# Patient Record
Sex: FEMALE | Race: WHITE | NOT HISPANIC OR LATINO | Employment: FULL TIME | URBAN - METROPOLITAN AREA
[De-identification: names, ages, dates, MRNs, and addresses within clinical notes are randomized per-mention and may not be internally consistent; named-entity substitution may affect disease eponyms.]

---

## 2022-11-21 ENCOUNTER — TELEPHONE (OUTPATIENT)
Dept: OBGYN CLINIC | Facility: CLINIC | Age: 37
End: 2022-11-21

## 2022-11-21 NOTE — TELEPHONE ENCOUNTER
Patient was seen by Dr. Solares today to prepare for procedure tomorrow - patient's  called and mentioned that she was supposed to have a medication sent over to her pharm but he said the pharm said it hasn't been sent in yet.     Not sure what medication it is...

## 2023-11-30 ENCOUNTER — TELEPHONE (OUTPATIENT)
Dept: NEUROLOGY | Facility: CLINIC | Age: 38
End: 2023-11-30

## 2023-11-30 NOTE — TELEPHONE ENCOUNTER
11/29 12:08    Pt left a VM re: upcoming Solumedrol infusion and respiratory infection.    Transcribed VM:   Hi, this is Pepper Forbes. I'm a patient of Dr. Sosa. My YOB: 1985. I am calling with a question regarding my upcoming infusion. I'm scheduled this Saturday, December 2nd for a Solumedrol infusion. The intent is to do it the 1st day of my cycle. My cycle has started today. As you can probably hear, I've got a nasty respiratory bug. So you know, I'm not sure that I should even attempt to go into the infusion center, whether I should take steroids. If you could please give me a call back with a bit of guidance. I would appreciate it. Thanks and have a nice day, bye.

## 2023-12-04 NOTE — TELEPHONE ENCOUNTER
Per chart review, pt did receive Solumedrol on 12/2/23.     Called and Left a message on pt's answering machine for a call back

## 2024-03-11 DIAGNOSIS — G35 MULTIPLE SCLEROSIS (HCC): Primary | ICD-10-CM

## 2024-03-11 RX ORDER — SODIUM CHLORIDE 9 MG/ML
20 INJECTION, SOLUTION INTRAVENOUS ONCE
Status: CANCELLED | OUTPATIENT
Start: 2024-03-14

## 2024-03-14 ENCOUNTER — HOSPITAL ENCOUNTER (OUTPATIENT)
Dept: INFUSION CENTER | Facility: HOSPITAL | Age: 39
Discharge: HOME | End: 2024-03-14
Attending: PSYCHIATRY & NEUROLOGY

## 2024-03-14 ENCOUNTER — OFFICE VISIT (OUTPATIENT)
Dept: FAMILY MEDICINE CLINIC | Facility: CLINIC | Age: 39
End: 2024-03-14
Payer: COMMERCIAL

## 2024-03-14 VITALS
OXYGEN SATURATION: 98 % | TEMPERATURE: 98.7 F | RESPIRATION RATE: 18 BRPM | HEART RATE: 91 BPM | BODY MASS INDEX: 33.62 KG/M2 | HEIGHT: 66 IN | SYSTOLIC BLOOD PRESSURE: 122 MMHG | DIASTOLIC BLOOD PRESSURE: 78 MMHG | WEIGHT: 209.2 LBS

## 2024-03-14 DIAGNOSIS — N30.01 ACUTE CYSTITIS WITH HEMATURIA: Primary | ICD-10-CM

## 2024-03-14 DIAGNOSIS — G35 MULTIPLE SCLEROSIS (HCC): ICD-10-CM

## 2024-03-14 LAB
BACTERIA UR QL AUTO: ABNORMAL /HPF
BILIRUB UR QL STRIP: NEGATIVE
CLARITY UR: CLEAR
COLOR UR: YELLOW
GLUCOSE UR STRIP-MCNC: NEGATIVE MG/DL
HGB UR QL STRIP.AUTO: ABNORMAL
KETONES UR STRIP-MCNC: NEGATIVE MG/DL
LEUKOCYTE ESTERASE UR QL STRIP: ABNORMAL
MUCOUS THREADS UR QL AUTO: ABNORMAL
NITRITE UR QL STRIP: NEGATIVE
NON-SQ EPI CELLS URNS QL MICRO: ABNORMAL /HPF
PH UR STRIP.AUTO: 6.5 [PH]
PROT UR STRIP-MCNC: ABNORMAL MG/DL
RBC #/AREA URNS AUTO: ABNORMAL /HPF
SL AMB  POCT GLUCOSE, UA: ABNORMAL
SL AMB LEUKOCYTE ESTERASE,UA: 1
SL AMB POCT BILIRUBIN,UA: ABNORMAL
SL AMB POCT BLOOD,UA: 2
SL AMB POCT CLARITY,UA: ABNORMAL
SL AMB POCT COLOR,UA: ABNORMAL
SL AMB POCT KETONES,UA: ABNORMAL
SL AMB POCT NITRITE,UA: ABNORMAL
SL AMB POCT PH,UA: 6
SL AMB POCT SPECIFIC GRAVITY,UA: 1.01
SL AMB POCT URINE PROTEIN: ABNORMAL
SL AMB POCT UROBILINOGEN: ABNORMAL
SP GR UR STRIP.AUTO: 1.02 (ref 1–1.03)
UROBILINOGEN UR STRIP-ACNC: <2 MG/DL
WBC #/AREA URNS AUTO: ABNORMAL /HPF

## 2024-03-14 PROCEDURE — 87186 SC STD MICRODIL/AGAR DIL: CPT | Performed by: NURSE PRACTITIONER

## 2024-03-14 PROCEDURE — 81002 URINALYSIS NONAUTO W/O SCOPE: CPT | Performed by: NURSE PRACTITIONER

## 2024-03-14 PROCEDURE — 99214 OFFICE O/P EST MOD 30 MIN: CPT | Performed by: NURSE PRACTITIONER

## 2024-03-14 PROCEDURE — 87077 CULTURE AEROBIC IDENTIFY: CPT | Performed by: NURSE PRACTITIONER

## 2024-03-14 PROCEDURE — 87086 URINE CULTURE/COLONY COUNT: CPT | Performed by: NURSE PRACTITIONER

## 2024-03-14 PROCEDURE — 81001 URINALYSIS AUTO W/SCOPE: CPT | Performed by: NURSE PRACTITIONER

## 2024-03-14 RX ORDER — CIPROFLOXACIN 500 MG/1
500 TABLET, FILM COATED ORAL EVERY 12 HOURS SCHEDULED
Qty: 10 TABLET | Refills: 0 | Status: SHIPPED | OUTPATIENT
Start: 2024-03-14 | End: 2024-03-19

## 2024-03-14 NOTE — PROGRESS NOTES
OFFICE VISIT  Pepper Hopson 38 y.o. female MRN: [id number]      Depression Screening and Follow-up Plan: Patient was screened for depression during today's encounter. They screened negative with a PHQ-2 score of 1.    Tobacco Cessation Counseling: Tobacco cessation counseling was provided. The patient is sincerely urged to quit consumption of tobacco. She is not ready to quit tobacco.          Assessment / Plan:  Problem List Items Addressed This Visit          Nervous and Auditory    Multiple sclerosis (HCC)     Under the care of neurology, current infusion, cathflo  No flare up with current infection            Other Visit Diagnoses       Acute cystitis with hematuria    -  Primary    Relevant Medications    ciprofloxacin (CIPRO) 500 mg tablet    Other Relevant Orders    POCT urine dip (Completed)    Urine culture    UA (URINE) with reflex to Scope              Reason For Visit / Chief Complaint  Chief Complaint   Patient presents with    Urinary Tract Infection     Urgency, frequency, unable to empty bladder, bear down, dysuria, hematuria.        HPI:  Pepper Hopson is a 38 y.o. female who presents today for UTI lke symptoms She reports hx of uti, similar symptoms. Urgency, burning, blood in urine.    Historical Information   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)      Past Surgical History:   Procedure Laterality Date    [place] INDUCED  DILATION & EVACUATION N/A 2022    Procedure: DILATATION AND EVACUATION (D&E)  18 weeks;  Surgeon: Torrie Solares MD;  Location: [facility];  Service: Gynecology    TONSILLECTOMY      WISDOM TOOTH EXTRACTION       Social History   Social History     Substance and Sexual Activity   Alcohol Use Not Currently    Comment: socially     Social History     Substance and Sexual Activity   Drug Use Never     Social History     Tobacco Use   Smoking Status Some Days    Current packs/day: 0.50    Average  packs/day: 0.5 packs/day for 3.2 years (1.6 ttl pk-yrs)    Types: Cigarettes    Start date: 2021    Passive exposure: Current   Smokeless Tobacco Never   Tobacco Comments    Does smoke once in a while     Family History   Problem Relation Age of Onset    No Known Problems Mother     Diabetes Father     No Known Problems Maternal Grandmother     Prostate cancer Maternal Grandfather     Dementia Maternal Grandfather     No Known Problems Paternal Grandmother     Prostate cancer Paternal Grandfather         Prostate Cancer    Diabetes Paternal Grandfather        Meds/Allergies   Allergies   Allergen Reactions    Latex Dermatitis       Meds:    Current Outpatient Medications:     ciprofloxacin (CIPRO) 500 mg tablet, Take 1 tablet (500 mg total) by mouth every 12 (twelve) hours for 5 days, Disp: 10 tablet, Rfl: 0    methylPREDNISolone sodium succinate (Solu-MEDROL) 40 mg injection, Inject into a catheter in a vein once, Disp: , Rfl:       REVIEW OF SYSTEMS  Review of Systems   Constitutional:  Negative for activity change, chills, fatigue and fever.   HENT:  Negative for congestion, ear discharge, ear pain, sinus pressure, sinus pain, sore throat, tinnitus and trouble swallowing.    Eyes:  Negative for photophobia, pain, discharge, itching and visual disturbance.   Respiratory:  Negative for cough, chest tightness, shortness of breath and wheezing.    Cardiovascular:  Negative for chest pain and leg swelling.   Gastrointestinal:  Negative for abdominal distention, abdominal pain, constipation, diarrhea, nausea and vomiting.   Endocrine: Negative for polydipsia, polyphagia and polyuria.   Genitourinary:  Positive for frequency and urgency. Negative for dysuria.   Musculoskeletal:  Negative for arthralgias, myalgias, neck pain and neck stiffness.   Skin:  Negative for color change.   Neurological:  Negative for dizziness, syncope, weakness, numbness and headaches.   Hematological:  Does not bruise/bleed easily.  "  Psychiatric/Behavioral:  Negative for behavioral problems, confusion, self-injury, sleep disturbance and suicidal ideas. The patient is not nervous/anxious.            Current Vitals:   Blood Pressure: 122/78 (03/14/24 1225)  Pulse: 91 (03/14/24 1225)  Temperature: 98.7 °F (37.1 °C) (03/14/24 1225)  Temp Source: Tympanic (03/14/24 1225)  Respirations: 18 (03/14/24 1225)  Height: 5' 6\" (167.6 cm) (03/14/24 1225)  Weight - Scale: 94.9 kg (209 lb 3.2 oz) (03/14/24 1225)  SpO2: 98 % (03/14/24 1225)  [unfilled]    PHYSICAL EXAMS:  Physical Exam  Vitals and nursing note reviewed.   Constitutional:       Appearance: Normal appearance.   HENT:      Head: Normocephalic and atraumatic.   Cardiovascular:      Rate and Rhythm: Normal rate and regular rhythm.      Pulses: Normal pulses.      Heart sounds: Normal heart sounds.   Pulmonary:      Effort: Pulmonary effort is normal.      Breath sounds: Normal breath sounds.   Musculoskeletal:      Cervical back: Normal range of motion and neck supple.   Neurological:      Mental Status: She is alert.             Lab, imaging and other studies: I have personally reviewed pertinent reports.  .             "

## 2024-03-16 LAB
BACTERIA UR CULT: ABNORMAL
BACTERIA UR CULT: ABNORMAL

## 2024-03-18 ENCOUNTER — HOSPITAL ENCOUNTER (OUTPATIENT)
Dept: INFUSION CENTER | Facility: HOSPITAL | Age: 39
Discharge: HOME | End: 2024-03-18
Attending: PSYCHIATRY & NEUROLOGY
Payer: COMMERCIAL

## 2024-03-18 VITALS — SYSTOLIC BLOOD PRESSURE: 102 MMHG | DIASTOLIC BLOOD PRESSURE: 64 MMHG | RESPIRATION RATE: 18 BRPM | TEMPERATURE: 97.2 F

## 2024-03-18 DIAGNOSIS — G35 MULTIPLE SCLEROSIS (HCC): Primary | ICD-10-CM

## 2024-03-18 PROCEDURE — 96365 THER/PROPH/DIAG IV INF INIT: CPT

## 2024-03-18 PROCEDURE — 96376 TX/PRO/DX INJ SAME DRUG ADON: CPT

## 2024-03-18 RX ORDER — SODIUM CHLORIDE 9 MG/ML
20 INJECTION, SOLUTION INTRAVENOUS ONCE
OUTPATIENT
Start: 2024-04-13

## 2024-03-18 RX ORDER — SODIUM CHLORIDE 9 MG/ML
20 INJECTION, SOLUTION INTRAVENOUS ONCE
Status: COMPLETED | OUTPATIENT
Start: 2024-03-18 | End: 2024-03-18

## 2024-03-18 RX ADMIN — SODIUM CHLORIDE 20 ML/HR: 0.9 INJECTION, SOLUTION INTRAVENOUS at 12:56

## 2024-03-18 RX ADMIN — SODIUM CHLORIDE 1000 MG: 0.9 INJECTION, SOLUTION INTRAVENOUS at 12:59

## 2024-03-18 NOTE — PROGRESS NOTES
Pepper Hopson  tolerated iv methyl prenisolone  treatment well with no complications.      Pepper Hopson is aware of future appt on 4/17/24 at 1300.     AVS printed and given to Pepper Hopson:  No (Declined by Pepper Hopson)

## 2024-04-17 ENCOUNTER — HOSPITAL ENCOUNTER (OUTPATIENT)
Dept: INFUSION CENTER | Facility: HOSPITAL | Age: 39
Discharge: HOME | End: 2024-04-17
Attending: PSYCHIATRY & NEUROLOGY
Payer: COMMERCIAL

## 2024-04-17 VITALS
TEMPERATURE: 96.7 F | HEART RATE: 70 BPM | RESPIRATION RATE: 16 BRPM | DIASTOLIC BLOOD PRESSURE: 76 MMHG | SYSTOLIC BLOOD PRESSURE: 111 MMHG

## 2024-04-17 DIAGNOSIS — G35 MULTIPLE SCLEROSIS (HCC): Primary | ICD-10-CM

## 2024-04-17 PROCEDURE — 96365 THER/PROPH/DIAG IV INF INIT: CPT

## 2024-04-17 RX ORDER — SODIUM CHLORIDE 9 MG/ML
20 INJECTION, SOLUTION INTRAVENOUS ONCE
Status: COMPLETED | OUTPATIENT
Start: 2024-04-17 | End: 2024-04-17

## 2024-04-17 RX ORDER — SODIUM CHLORIDE 9 MG/ML
20 INJECTION, SOLUTION INTRAVENOUS ONCE
OUTPATIENT
Start: 2024-05-17

## 2024-04-17 RX ADMIN — SODIUM CHLORIDE 20 ML/HR: 0.9 INJECTION, SOLUTION INTRAVENOUS at 13:26

## 2024-04-17 RX ADMIN — SODIUM CHLORIDE 1000 MG: 0.9 INJECTION, SOLUTION INTRAVENOUS at 13:58

## 2024-04-17 NOTE — PROGRESS NOTES
Pepper Hopson  tolerated Solumedrol treatment well with no complications.      Pepper Hopson is aware of future appt on 5/10 at 11:30AM.     AVS printed and given to Pepper Hopson: No (Declined by Pepper Hopson).

## 2024-05-09 ENCOUNTER — TELEPHONE (OUTPATIENT)
Dept: NEUROLOGY | Facility: CLINIC | Age: 39
End: 2024-05-09

## 2024-05-09 ENCOUNTER — OFFICE VISIT (OUTPATIENT)
Dept: FAMILY MEDICINE CLINIC | Facility: CLINIC | Age: 39
End: 2024-05-09
Payer: COMMERCIAL

## 2024-05-09 VITALS
OXYGEN SATURATION: 98 % | HEIGHT: 66 IN | DIASTOLIC BLOOD PRESSURE: 80 MMHG | HEART RATE: 62 BPM | BODY MASS INDEX: 33.91 KG/M2 | SYSTOLIC BLOOD PRESSURE: 128 MMHG | RESPIRATION RATE: 18 BRPM | WEIGHT: 211 LBS | TEMPERATURE: 98.3 F

## 2024-05-09 DIAGNOSIS — Z00.00 ENCOUNTER FOR WELL ADULT EXAM WITHOUT ABNORMAL FINDINGS: Primary | ICD-10-CM

## 2024-05-09 PROCEDURE — 99395 PREV VISIT EST AGE 18-39: CPT | Performed by: NURSE PRACTITIONER

## 2024-05-09 NOTE — TELEPHONE ENCOUNTER
Called [employer] at [phone] and spoke with Iwona. She reports case is marked urgent, tasked for review. States they are expecting determination by tomorrow AM. Did advise appt is at 11:30am. She reports she also spoke with Monet who confirms she is actively working on this.

## 2024-05-09 NOTE — TELEPHONE ENCOUNTER
Received the following email:    To all;    Per call to [facility]/ [employer] rep advised AUTH is required for codes    [id number]  in OP setting.    Please submit and advise once approved.  Patient has a GH infusion appt on Friday  5/10    Thanks in advance    ….. Moon Salmeron  Pre-Encounter Representative  [address] [facility]  [place] [address]  [phone] Office  [phone] Fax  [email or web address]    Unable to locate pt on [facility] provider portal.     Called [facility]/[employer] at [phone] and spoke with Monet. She confirmed codes , 05410, and 58467 do now require PA. Initiated verbal PA. Clinicals to be faxed to [phone]. Pending auth # [id number]. She reports she is unable to rolando requests as urgent. States case is under review with [facility] care management. Must call them directly at [phone].     Called [facility] care management at [phone] and spoke with Radha. She reviewed above case and advised PA is not required for , 02346, or 72213. Call disconnected before obtaining reference #. Called back and spoke with Antione.  He confirms PA is not required for , 70318, or 65694. He states predetermination is not recommended for these codes. Reference: Antione AZAR 5/9/24    Received voicemail from Monet with [employer] stating they changed case to predetermination. Asking for clinicals to be faxed to [phone]. They will try to review today.   phone # [phone] ext [phone].    Clinicals faxed successfully. Request uploaded to chart. Will f/u with Monet.

## 2024-05-09 NOTE — PROGRESS NOTES
ADULT ANNUAL PHYSICAL  [facility]    NAME: Pepper Hopson  AGE: 38 y.o. SEX: female  : 1985     DATE: 2024     Assessment and Plan:     Problem List Items Addressed This Visit    None  Visit Diagnoses       Encounter for well adult exam without abnormal findings    -  Primary            Immunizations and preventive care screenings were discussed with patient today. Appropriate education was printed on patient's after visit summary.    Counseling:  Alcohol/drug use: discussed moderation in alcohol intake, the recommendations for healthy alcohol use, and avoidance of illicit drug use.  Dental Health: discussed importance of regular tooth brushing, flossing, and dental visits.  Injury prevention: discussed safety/seat belts, safety helmets, smoke detectors, carbon dioxide detectors, and smoking near bedding or upholstery.  Sexual health: discussed sexually transmitted diseases, partner selection, use of condoms, avoidance of unintended pregnancy, and contraceptive alternatives.  Exercise: the importance of regular exercise/physical activity was discussed. Recommend exercise 3-5 times per week for at least 30 minutes.       Tobacco Cessation Counseling: The patient is sincerely urged to quit consumption of tobacco. She is not ready to quit tobacco. Medication options discussed.         Return in about 1 year (around 2025) for Annual physical.     Chief Complaint:     Chief Complaint   Patient presents with    Physical Exam      History of Present Illness:     Adult Annual Physical   Patient here for a comprehensive physical exam. The patient reports no problems.    Diet and Physical Activity  Diet/Nutrition: well balanced diet, poor diet, and adding vegetables. Tried weight watchers .   Exercise: walking and recent puppy  .      Depression Screening  PHQ-2/9 Depression Screening    Little interest or pleasure in doing things: 1 - several  days  Feeling down, depressed, or hopeless: 0 - not at all       General Health  Sleep: gets 4-6 hours of sleep on average.   Hearing: normal - bilateral.  Vision: no vision problems.   Dental: regular dental visits.       /GYN Health  Follows with gynecology? yes   Last menstrual period: regular  Contraceptive method:  none .  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 no  Do you have a durable medical power of ? no  ACP document given to the patient? no      Review of Systems:     Review of Systems   Constitutional:  Negative for activity change, chills, fatigue and fever.   HENT:  Negative for congestion, ear discharge, ear pain, sinus pressure, sinus pain, sore throat, tinnitus and trouble swallowing.    Eyes:  Negative for photophobia, pain, discharge, itching and visual disturbance.   Respiratory:  Negative for cough, chest tightness, shortness of breath and wheezing.    Cardiovascular:  Negative for chest pain and leg swelling.   Gastrointestinal:  Negative for abdominal distention, abdominal pain, constipation, diarrhea, nausea and vomiting.   Endocrine: Negative for polydipsia, polyphagia and polyuria.   Genitourinary:  Negative for dysuria and frequency.   Musculoskeletal:  Negative for arthralgias, myalgias, neck pain and neck stiffness.   Skin:  Negative for color change.   Neurological:  Negative for dizziness, syncope, weakness, numbness and headaches.   Hematological:  Does not bruise/bleed easily.   Psychiatric/Behavioral:  Negative for behavioral problems, confusion, self-injury, sleep disturbance and suicidal ideas. The patient is not nervous/anxious.       Past Medical History:     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)       Past Surgical History:     Past Surgical History:   Procedure Laterality Date    [place] INDUCED  DILATION & EVACUATION N/A 2022    Procedure:  DILATATION AND EVACUATION (D&E)  18 weeks;  Surgeon: Torrie Solares MD;  [facility];  Service: Gynecology    TONSILLECTOMY      WISDOM TOOTH EXTRACTION        Social History:     Social History     Socioeconomic History    Marital status: Single     Spouse name: None    Number of children: None    Years of education: None    Highest education level: None   Occupational History    None   Tobacco Use    Smoking status: Every Day     Current packs/day: 0.50     Average packs/day: 0.5 packs/day for 3.4 years (1.7 ttl pk-yrs)     Types: Cigarettes     Start date: 2021     Passive exposure: Current    Smokeless tobacco: Never    Tobacco comments:     Does smoke once in a while   Vaping Use    Vaping status: Never Used   Substance and Sexual Activity    Alcohol use: Not Currently     Comment: socially    Drug use: Never    Sexual activity: Yes     Partners: Male     Birth control/protection: None   Other Topics Concern    None   Social History Narrative    None     Social Determinants of Health     Financial Resource Strain: Not on file   Food Insecurity: Not on file   Transportation Needs: Not on file   Physical Activity: Not on file   Stress: Not on file   Social Connections: Not on file   Intimate Partner Violence: Not on file   Housing Stability: Not on file      Family History:     Family History   Problem Relation Age of Onset    No Known Problems Mother     Diabetes Father     No Known Problems Maternal Grandmother     Prostate cancer Maternal Grandfather     Dementia Maternal Grandfather     No Known Problems Paternal Grandmother     Prostate cancer Paternal Grandfather         Prostate Cancer    Diabetes Paternal Grandfather       Current Medications:     Current Outpatient Medications   Medication Sig Dispense Refill    methylPREDNISolone sodium succinate (Solu-MEDROL) 40 mg injection Inject into a catheter in a vein once       No current facility-administered medications for this visit.       "Allergies:     Allergies   Allergen Reactions    Latex Dermatitis      Physical Exam:     /80 (BP Location: Left arm, Patient Position: Sitting, Cuff Size: Standard)   Pulse 62   Temp 98.3 °F (36.8 °C) (Tympanic)   Resp 18   Ht 5' 6\" (1.676 m)   Wt 95.7 kg (211 lb)   SpO2 98%   BMI 34.06 kg/m²     Physical Exam  Vitals and nursing note reviewed.   Constitutional:       Appearance: Normal appearance.   HENT:      Head: Normocephalic and atraumatic.   Cardiovascular:      Rate and Rhythm: Normal rate and regular rhythm.      Pulses: Normal pulses.      Heart sounds: Normal heart sounds.   Musculoskeletal:         General: Normal range of motion.      Cervical back: Normal range of motion and neck supple.   Skin:     General: Skin is warm.   Neurological:      Mental Status: She is alert and oriented to person, place, and time.   Psychiatric:         Behavior: Behavior normal.          JESSICA Akbar   [facility]    "

## 2024-05-10 NOTE — TELEPHONE ENCOUNTER
Called [employer] at [phone] and spoke with Iwona. Solumedrol infusions are approved:    Solumedrol (, 84395, and 21599) is approved with [facility]/[employer] from 5/9/24 to 6/20/24 for 2 visits. Auth # [id number].    Referral updated.

## 2024-05-13 ENCOUNTER — HOSPITAL ENCOUNTER (OUTPATIENT)
Dept: INFUSION CENTER | Facility: HOSPITAL | Age: 39
Discharge: HOME | End: 2024-05-13
Attending: PSYCHIATRY & NEUROLOGY
Payer: COMMERCIAL

## 2024-05-13 DIAGNOSIS — G35 MULTIPLE SCLEROSIS (HCC): Primary | ICD-10-CM

## 2024-05-13 RX ORDER — SODIUM CHLORIDE 9 MG/ML
20 INJECTION, SOLUTION INTRAVENOUS ONCE
OUTPATIENT
Start: 2024-05-17

## 2024-05-13 RX ORDER — SODIUM CHLORIDE 9 MG/ML
20 INJECTION, SOLUTION INTRAVENOUS ONCE
Status: COMPLETED | OUTPATIENT
Start: 2024-05-13 | End: 2024-05-13

## 2024-05-13 RX ADMIN — SODIUM CHLORIDE 20 ML/HR: 0.9 INJECTION, SOLUTION INTRAVENOUS at 12:00

## 2024-05-13 RX ADMIN — SODIUM CHLORIDE 1000 MG: 0.9 INJECTION, SOLUTION INTRAVENOUS at 12:35

## 2024-05-13 NOTE — PROGRESS NOTES
Pepper Hopson  tolerated iv steroids well with no complications.      Pepper Hopson is aware of future appt on June 12th     AVS printed and given to Pepper Hopson:  No (Declined by Pepper Hopson)

## 2024-06-12 ENCOUNTER — OFFICE VISIT (OUTPATIENT)
Dept: NEUROLOGY | Facility: CLINIC | Age: 39
End: 2024-06-12
Payer: COMMERCIAL

## 2024-06-12 ENCOUNTER — TELEPHONE (OUTPATIENT)
Dept: NEUROLOGY | Facility: CLINIC | Age: 39
End: 2024-06-12

## 2024-06-12 VITALS
TEMPERATURE: 98.2 F | BODY MASS INDEX: 34.15 KG/M2 | OXYGEN SATURATION: 97 % | WEIGHT: 211.6 LBS | DIASTOLIC BLOOD PRESSURE: 74 MMHG | SYSTOLIC BLOOD PRESSURE: 112 MMHG | HEART RATE: 95 BPM

## 2024-06-12 DIAGNOSIS — G35 MULTIPLE SCLEROSIS (HCC): Primary | ICD-10-CM

## 2024-06-12 PROCEDURE — 99215 OFFICE O/P EST HI 40 MIN: CPT | Performed by: PSYCHIATRY & NEUROLOGY

## 2024-06-12 NOTE — TELEPHONE ENCOUNTER
Dr. Stephens,    I am following Pepper Hopson in the office for MS.     I would like if you could see the patient for MS.    The intent of your evaluation would be for a transfer of care. If agreeable, the patient should be scheduled with specialty attending and previously scheduled follow up appointments scheduled with me (transferring doctor) will be cancelled.     I saw the patient today in [place] office and she would like to have her FU visit in [place] office. She was scheduled with Joo but under your supervision if you accept it.     Thanks,    Krystyna Sosa MD

## 2024-06-12 NOTE — TELEPHONE ENCOUNTER
If transferring care then appointment should be scheduled with Dr. Stephens.   Otherwise if you are continuing to see patient and she is only in need of follow up in [place]- she can be scheduled with me.

## 2024-06-12 NOTE — PROGRESS NOTES
Patient ID: Pepper Hopson is a 38 y.o. female.    Assessment/Plan:           Problem List Items Addressed This Visit          Nervous and Auditory    Multiple sclerosis (HCC) - Primary    Relevant Orders    Hepatitis panel, acute    Quantiferon TB Gold Plus Assay    HIV 1/2 AG/AB w Reflex [facility] for 2 yr old and above    IgG, IgA, IgM    CBC and differential    Comprehensive metabolic panel        Mrs. Hopson has presented to [facility] [place] for follow-up of multiple sclerosis and related concerns.  Patient describes no new sensorimotor dysfunction as patient is fully ambulatory with no focal weakness or balance issues, no visual dysfunction or vertigo.  Patient had last completed disease modifying therapy in January 2023, at that time patient received ocrelizumab 600 mg IV.  Patient was transitioned to Solu-Medrol 1 g IV infusion on the first day of the menstrual cycle in preparation for pregnancy.  Patient received last 3 doses of Solu-Medrol on March 18, April 17, and May 13, 2024.    Patient has been traveling related to her job and she finds it difficult to be able to accommodate Solu-Medrol infusion in her work-related schedule.    We also discussed potential comorbidities which can be triggered by chronic steroid use including drug-induced diabetes, osteoporosis, avascular necrosis, mood swings and other hormonal dysfunction.    Considering patient had brain MRI completed in January 2024 with no disease progression or new imaging place advised, patient was offered starting ofatumumab in preparation to her pregnancy.  We reviewed risk and benefits as well as mechanism of action of medication.  Patient was offered basic serologic workup considering starting again high efficacy immunosuppressive regimen.  Form was signed and faxed.    Patient finds herself more fatigued and more depressed due to recent family loss.    Patient is to continue healthy dietary approach and regular physical  activity.  Patient is to continue prenatal vitamins.    Patient will be following at [place] neurology office, as per the patient request, as it saves her travel time.     I have spent a total time of 35 minutes on 06/12/24 in caring for this patient including Prognosis, Risks and benefits of tx options, Instructions for management, Counseling / Coordination of care, Documenting in the medical record, Reviewing / ordering tests, medicine, procedures  , Obtaining or reviewing history  , and Communicating with other healthcare professionals .     Subjective:MS and pregnancy related questions     HPI    Mrs. Forbes has presented to [facility] for follow-up on multiple sclerosis and related issues.    Today's concerns: no concerns re MS symptoms, however would like to discuss the solu medrol, would like to try and get pregnant but she travels for work and very difficult with the solumedrol.  Patient required multiple rescheduling of Solu-Medrol as her first day of menstrual period might be taking place while she is traveling with no access to Solu-Medrol infusion.     Patient describes no new sensorimotor dysfunction, no significant fatigue, no vision loss no double vision.  Patient ambulates without assistive devices.      We previously discussed natural history of multiple sclerosis and despite patient has moderate burden of demyelination, patient has excellent remyelinating capacity with no T1 black holes appreciated in her brain parenchyma.     Considering patient had ocrelizumab infusion in January 2023, patient was advised avoiding her infusion in July 2023 in preparation to conceive her child.  Patient will be offered having serological work-up with [facility] lymphocyte this weekend and upon menstrual cycle coming within the next 7 to 10 days, we will be offering Solu-Medrol 1 g IV on the first day of her menstrual cycle as patient has not changed her plans to conceive the child anytime this  year.      Patient is in a very good mood, she is motivated.  Patient is to continue healthy diet approach and regular physical activity.  Patient is to consider continue prenatal vitamins.    Patient describes no new sensorimotor dysfunction, no vision loss, no double vision.  Patient had last ocrelizumab infusion in 2023 as patient had started monthly Solu-Medrol 1 g IV infusion in 2023 in preparation for conceiving the child.         Patient was offered to establish care with urology for urodynamic study as long as she is not pregnant at that time.       The following portions of the patient's history were reviewed and updated as appropriate: She  has a past medical history of Multiple sclerosis affecting pregnancy in first trimester (HCC).  She   Patient Active Problem List    Diagnosis Date Noted    Numbness and tingling of left leg 2023    Abnormal chromosomal and genetic finding on  screening mother 2022    Multiple sclerosis (HCC) 2012     She  has a past surgical history that includes Tonsillectomy; [place] tooth extraction; and pr induced  dilation & evacuation (N/A, 2022).  Her family history includes Dementia in her maternal grandfather; Diabetes in her father and paternal grandfather; No Known Problems in her maternal grandmother, mother, and paternal grandmother; Prostate cancer in her maternal grandfather and paternal grandfather.  She  reports that she has been smoking cigarettes. She started smoking about 3 years ago. She has a 1.7 pack-year smoking history. She has been exposed to tobacco smoke. She has never used smokeless tobacco. She reports that she does not currently use alcohol. She reports that she does not use drugs.  Current Outpatient Medications   Medication Sig Dispense Refill    methylPREDNISolone sodium succinate (Solu-MEDROL) 40 mg injection Inject into a catheter in a vein once       No current facility-administered medications  for this visit.     Current Outpatient Medications on File Prior to Visit   Medication Sig    methylPREDNISolone sodium succinate (Solu-MEDROL) 40 mg injection Inject into a catheter in a vein once     No current facility-administered medications on file prior to visit.     She is allergic to latex..         Objective:    Blood pressure 112/74, pulse 95, temperature 98.2 °F (36.8 °C), temperature source Temporal, weight 96 kg (211 lb 9.6 oz), SpO2 97%, not currently breastfeeding.    Physical Exam    Neurological Exam  CONSTITUTIONAL: NAD, pleasant. NECK: supple, no lymphadenopathy, no thyromegaly, no JVD. CARDIOVASCULAR: RRR, normal S1S2, no murmurs, no rubs. RESP: clear to auscultation bilaterally, no wheezes/rhonchi/rales. ABDOMEN: soft, non tender, non distended. SKIN: no rash or skin lesions. EXTREMITIES: no edema, pulses 2+bilaterally. PSYCH: appropriate mood and affect  NEUROLOGIC COMPREHENSIVE EXAM: Patient is oriented to person, place and time, NAD; appropriate affect. CN II, III, IV, V, VI, VII,VIII,IX,X,XI-XII intact with EOMI, PERRLA, OKN intact, VF grossly intact, fundi poorly visualized secondary to pupillary constriction; symmetric face noted. Motor: 5/5 UE/LE bilateral symmetric; Sensory: intact to light touch and pinprick bilaterally; normal vibration sensation feet bilaterally; Coordination within normal limits on FTN and KRISTIAN testing; DTR: 2/4 through, no Babinski, no clonus. Tandem gait is intact. Romberg: absent.    ROS:    Review of Systems   Constitutional:  Negative for appetite change, fatigue and fever.   HENT: Negative.  Negative for hearing loss, tinnitus, trouble swallowing and voice change.    Eyes: Negative.  Negative for photophobia, pain and visual disturbance.   Respiratory: Negative.  Negative for shortness of breath.    Cardiovascular: Negative.  Negative for palpitations.   Gastrointestinal: Negative.  Negative for nausea and vomiting.   Endocrine: Negative.  Negative for cold  intolerance.   Genitourinary: Negative.  Negative for dysuria, frequency and urgency.   Musculoskeletal:  Negative for back pain, gait problem, myalgias, neck pain and neck stiffness.   Skin: Negative.  Negative for rash.   Allergic/Immunologic: Negative.    Neurological: Negative.  Negative for dizziness, tremors, seizures, syncope, facial asymmetry, speech difficulty, weakness, light-headedness, numbness and headaches.        No concerns symptom wise but would like to discuss meds   Hematological: Negative.  Does not bruise/bleed easily.   Psychiatric/Behavioral: Negative.  Negative for confusion, hallucinations and sleep disturbance.

## 2024-06-12 NOTE — TELEPHONE ENCOUNTER
Patient declined following with me at [facility].   Patient can be re-scheduled with Dr. Stephens if case accepted

## 2024-06-13 ENCOUNTER — TELEPHONE (OUTPATIENT)
Dept: NEUROLOGY | Facility: CLINIC | Age: 39
End: 2024-06-13

## 2024-06-13 NOTE — TELEPHONE ENCOUNTER
Please cancel FU visit with Joo LUCERO was completed and Dr. Stephens accepted the case and he agreed to follow closely with the patient.    FU 3 mos - 60 min OVL advised

## 2024-06-14 NOTE — TELEPHONE ENCOUNTER
Called patient in attempt to advise of the below message and schedule 60 ovl w/ Dr Stephens appt as requested. No answer. LMOM and call back phone number.

## 2024-06-18 ENCOUNTER — APPOINTMENT (OUTPATIENT)
Dept: LAB | Facility: CLINIC | Age: 39
End: 2024-06-18
Payer: COMMERCIAL

## 2024-06-18 DIAGNOSIS — N31.9 NEUROGENIC BLADDER: ICD-10-CM

## 2024-06-18 DIAGNOSIS — G35 MULTIPLE SCLEROSIS (HCC): ICD-10-CM

## 2024-06-18 LAB
ALBUMIN SERPL BCP-MCNC: 4.1 G/DL (ref 3.5–5)
ALP SERPL-CCNC: 65 U/L (ref 34–104)
ALT SERPL W P-5'-P-CCNC: 17 U/L (ref 7–52)
ANION GAP SERPL CALCULATED.3IONS-SCNC: 8 MMOL/L (ref 4–13)
AST SERPL W P-5'-P-CCNC: 16 U/L (ref 13–39)
BASOPHILS # BLD AUTO: 0.06 THOUSANDS/ÂΜL (ref 0–0.1)
BASOPHILS NFR BLD AUTO: 1 % (ref 0–1)
BILIRUB SERPL-MCNC: 0.36 MG/DL (ref 0.2–1)
BILIRUB UR QL STRIP: NEGATIVE
BUN SERPL-MCNC: 9 MG/DL (ref 5–25)
CALCIUM SERPL-MCNC: 9 MG/DL (ref 8.4–10.2)
CHLORIDE SERPL-SCNC: 104 MMOL/L (ref 96–108)
CLARITY UR: CLEAR
CO2 SERPL-SCNC: 25 MMOL/L (ref 21–32)
COLOR UR: COLORLESS
CREAT SERPL-MCNC: 0.43 MG/DL (ref 0.6–1.3)
EOSINOPHIL # BLD AUTO: 0.4 THOUSAND/ÂΜL (ref 0–0.61)
EOSINOPHIL NFR BLD AUTO: 6 % (ref 0–6)
ERYTHROCYTE [DISTWIDTH] IN BLOOD BY AUTOMATED COUNT: 13.6 % (ref 11.6–15.1)
GFR SERPL CREATININE-BSD FRML MDRD: 131 ML/MIN/1.73SQ M
GLUCOSE P FAST SERPL-MCNC: 90 MG/DL (ref 65–99)
GLUCOSE UR STRIP-MCNC: NEGATIVE MG/DL
HAV IGM SER QL: NORMAL
HBV CORE IGM SER QL: NORMAL
HBV SURFACE AG SER QL: NORMAL
HCT VFR BLD AUTO: 45 % (ref 34.8–46.1)
HCV AB SER QL: NORMAL
HGB BLD-MCNC: 14.6 G/DL (ref 11.5–15.4)
HGB UR QL STRIP.AUTO: NEGATIVE
HIV 1+2 AB+HIV1 P24 AG SERPL QL IA: NORMAL
HIV 2 AB SERPL QL IA: NORMAL
HIV1 AB SERPL QL IA: NORMAL
HIV1 P24 AG SERPL QL IA: NORMAL
IGA SERPL-MCNC: 218 MG/DL (ref 66–433)
IGG SERPL-MCNC: 863 MG/DL (ref 635–1741)
IGM SERPL-MCNC: 38 MG/DL (ref 45–281)
IMM GRANULOCYTES # BLD AUTO: 0.02 THOUSAND/UL (ref 0–0.2)
IMM GRANULOCYTES NFR BLD AUTO: 0 % (ref 0–2)
KETONES UR STRIP-MCNC: NEGATIVE MG/DL
LEUKOCYTE ESTERASE UR QL STRIP: NEGATIVE
LYMPHOCYTES # BLD AUTO: 2.02 THOUSANDS/ÂΜL (ref 0.6–4.47)
LYMPHOCYTES NFR BLD AUTO: 30 % (ref 14–44)
MCH RBC QN AUTO: 29.3 PG (ref 26.8–34.3)
MCHC RBC AUTO-ENTMCNC: 32.4 G/DL (ref 31.4–37.4)
MCV RBC AUTO: 90 FL (ref 82–98)
MONOCYTES # BLD AUTO: 0.5 THOUSAND/ÂΜL (ref 0.17–1.22)
MONOCYTES NFR BLD AUTO: 7 % (ref 4–12)
NEUTROPHILS # BLD AUTO: 3.85 THOUSANDS/ÂΜL (ref 1.85–7.62)
NEUTS SEG NFR BLD AUTO: 56 % (ref 43–75)
NITRITE UR QL STRIP: NEGATIVE
NRBC BLD AUTO-RTO: 0 /100 WBCS
PH UR STRIP.AUTO: 6.5 [PH]
PLATELET # BLD AUTO: 317 THOUSANDS/UL (ref 149–390)
PMV BLD AUTO: 10.4 FL (ref 8.9–12.7)
POTASSIUM SERPL-SCNC: 4.2 MMOL/L (ref 3.5–5.3)
PROT SERPL-MCNC: 6.8 G/DL (ref 6.4–8.4)
PROT UR STRIP-MCNC: NEGATIVE MG/DL
RBC # BLD AUTO: 4.99 MILLION/UL (ref 3.81–5.12)
SODIUM SERPL-SCNC: 137 MMOL/L (ref 135–147)
SP GR UR STRIP.AUTO: 1.01 (ref 1–1.03)
T4 FREE SERPL-MCNC: 0.77 NG/DL (ref 0.61–1.12)
TSH SERPL DL<=0.05 MIU/L-ACNC: 1.07 UIU/ML (ref 0.45–4.5)
UROBILINOGEN UR STRIP-ACNC: <2 MG/DL
WBC # BLD AUTO: 6.85 THOUSAND/UL (ref 4.31–10.16)

## 2024-06-18 PROCEDURE — 80053 COMPREHEN METABOLIC PANEL: CPT | Performed by: PSYCHIATRY & NEUROLOGY

## 2024-06-18 PROCEDURE — 87389 HIV-1 AG W/HIV-1&-2 AB AG IA: CPT

## 2024-06-18 PROCEDURE — 80074 ACUTE HEPATITIS PANEL: CPT

## 2024-06-18 PROCEDURE — 84439 ASSAY OF FREE THYROXINE: CPT

## 2024-06-18 PROCEDURE — 81003 URINALYSIS AUTO W/O SCOPE: CPT

## 2024-06-18 PROCEDURE — 36415 COLL VENOUS BLD VENIPUNCTURE: CPT

## 2024-06-18 PROCEDURE — 82784 ASSAY IGA/IGD/IGG/IGM EACH: CPT

## 2024-06-18 PROCEDURE — 85025 COMPLETE CBC W/AUTO DIFF WBC: CPT

## 2024-06-18 PROCEDURE — 84443 ASSAY THYROID STIM HORMONE: CPT

## 2024-06-18 PROCEDURE — 86480 TB TEST CELL IMMUN MEASURE: CPT

## 2024-06-19 LAB
GAMMA INTERFERON BACKGROUND BLD IA-ACNC: 0.01 IU/ML
M TB IFN-G BLD-IMP: NEGATIVE
M TB IFN-G CD4+ BCKGRND COR BLD-ACNC: 0 IU/ML
M TB IFN-G CD4+ BCKGRND COR BLD-ACNC: 0 IU/ML
MITOGEN IGNF BCKGRD COR BLD-ACNC: 9.99 IU/ML

## 2024-06-24 ENCOUNTER — TELEPHONE (OUTPATIENT)
Dept: NEUROLOGY | Facility: CLINIC | Age: 39
End: 2024-06-24

## 2024-06-24 DIAGNOSIS — G35 MULTIPLE SCLEROSIS (HCC): Primary | ICD-10-CM

## 2024-06-24 NOTE — TELEPHONE ENCOUNTER
"Valeria  6/24/24 2:34 PM:    \"Mateo calling from [employer] calling regarding medication prior authorization. Starting Gopal. for Pepper Hopson [birth year]. PA can be done on [facility] with Key     CB# [phone]  --------------------------------------------------  ANABEL Herron     [facility] DANIEL: [id number]  Fax [phone]      "

## 2024-06-25 NOTE — TELEPHONE ENCOUNTER
New start [facility]. Form submitted 6/12/24.     Labs completed 6/18/24.     Below PA started on incorrect form. Initiated new PA on CM, Key: [id number]. Received immediate approval:    CaseId:[id number];Status:Approved;Review Type:Prior Auth;Coverage Start Date:05/26/2024;Coverage End Date:06/25/2025;;CaseId:[id number];Status:Approved;Review Type:Qty;Coverage Start Date:05/26/2024;Coverage End Date:07/25/2024;. Authorization Expiration Date: June 25, 2025.     Awaiting Rx transfer.

## 2024-07-05 RX ORDER — OFATUMUMAB 20 MG/.4ML
20 INJECTION, SOLUTION SUBCUTANEOUS
Qty: 0.4 ML | Refills: 11 | Status: SHIPPED | OUTPATIENT
Start: 2024-07-05

## 2024-07-05 RX ORDER — OFATUMUMAB 20 MG/.4ML
INJECTION, SOLUTION SUBCUTANEOUS
Qty: 1.2 ML | Refills: 0 | Status: SHIPPED | OUTPATIENT
Start: 2024-07-05

## 2024-07-05 NOTE — TELEPHONE ENCOUNTER
Pt not found in [facility] portal. Prescriptions likely not received from [facility] team.     Scripts pended, please review and sign if agreeable.

## 2024-07-19 ENCOUNTER — TELEPHONE (OUTPATIENT)
Dept: NEUROLOGY | Facility: CLINIC | Age: 39
End: 2024-07-19

## 2024-07-22 ENCOUNTER — TELEPHONE (OUTPATIENT)
Dept: NEUROLOGY | Facility: CLINIC | Age: 39
End: 2024-07-22

## 2024-08-05 ENCOUNTER — TELEPHONE (OUTPATIENT)
Age: 39
End: 2024-08-05

## 2024-08-05 NOTE — TELEPHONE ENCOUNTER
Pt has possible UTI can medication be sent in provider had previously discuss  a virtual for next visit. Pt is  open to virtual tomorrow but ask if meds can be sent in today.

## 2024-08-05 NOTE — TELEPHONE ENCOUNTER
Reached out to patient and offered her a time for today and tomorrow for unable to do those times. Patient made aware PCP is out of the office all week

## 2024-08-12 DIAGNOSIS — G35 MULTIPLE SCLEROSIS (HCC): Primary | ICD-10-CM

## 2024-08-26 ENCOUNTER — OFFICE VISIT (OUTPATIENT)
Dept: OBGYN CLINIC | Facility: CLINIC | Age: 39
End: 2024-08-26
Payer: COMMERCIAL

## 2024-08-26 VITALS
WEIGHT: 218 LBS | SYSTOLIC BLOOD PRESSURE: 120 MMHG | HEIGHT: 65 IN | BODY MASS INDEX: 36.32 KG/M2 | DIASTOLIC BLOOD PRESSURE: 94 MMHG

## 2024-08-26 DIAGNOSIS — Z12.31 ENCOUNTER FOR SCREENING MAMMOGRAM FOR MALIGNANT NEOPLASM OF BREAST: ICD-10-CM

## 2024-08-26 DIAGNOSIS — Z11.51 SCREENING FOR HUMAN PAPILLOMAVIRUS (HPV): ICD-10-CM

## 2024-08-26 DIAGNOSIS — Z01.419 ROUTINE GYNECOLOGICAL EXAMINATION: Primary | ICD-10-CM

## 2024-08-26 PROCEDURE — G0476 HPV COMBO ASSAY CA SCREEN: HCPCS | Performed by: PHYSICIAN ASSISTANT

## 2024-08-26 PROCEDURE — 99395 PREV VISIT EST AGE 18-39: CPT | Performed by: PHYSICIAN ASSISTANT

## 2024-08-26 PROCEDURE — G0145 SCR C/V CYTO,THINLAYER,RESCR: HCPCS | Performed by: PHYSICIAN ASSISTANT

## 2024-08-26 NOTE — PROGRESS NOTES
Assessment   38 y.o.  presenting for annual exam.     Plan   Diagnoses and all orders for this visit:    Routine gynecological examination  -     Liquid-based pap, screening    Normal findings on routine exam.  Encouraged 150 min of exercise per week.  Reviewed balanced diet.   Multivitamin encouraged   Breast awareness/SBE encouraged     Screening for human papillomavirus (HPV)  -     Liquid-based pap, screening    Encounter for screening mammogram for malignant neoplasm of breast  -     Mammo screening bilateral w 3d & cad; Future        Pap - done today   Mammo slip given (on request)   Colonoscopy due @ 45       RTO one year for yearly exam or sooner as needed.    __________________________________________________________________    Subjective     Pepper Hopson is a 38 y.o.  presenting for annual exam.     Works for [employer], primarily from home. Work hasn't been as busy lately and is traveling less. As a result is going a little stir crazy. Signed up for [employer] program. Not exercising regularly but tries to get outside with her puppy daily.     She and her partner are doing well. Underwent TAB 2022 due to Trisomy 21. They remain open to pregnancy and are not using contraception. Not interested in reproductive endo consult. Believes her partner has abn sperm since he was unable to conceive with his prior partner x 10 years.     Has been changed to Kesimpta by her neurologist for her MS. Good control with this.     Recent health scare due to two friends dx with breast CA and father dx with unexpected cancer in the last year. Requesting early mammogram.     SCREENING  Last Pap: 10/13/2022 ASCUS/HPV 16 +   Last Mammo: n/a  Last Colonoscopy: n/a     GYN    Periods are regular, q 26-28 days, lasting 7 days.  Dysmenorrhea:mild.   Cyclic symptoms - mild     Sexually active:  with her    Concerns: denies pain, bleeding, dryness   Contraception: none - open to pregnancy.      Hx Abnormal pap:    10/13/2022 ASCUS/HPV 16 +   2023 colpo benign.   We reviewed ASCCP guidelines for Pap testing today.  Gardasil: She has not completed the Gardasil series.    Denies vaginal discharge, itching, odor, dyspareunia, pelvic pain and vulvar/vaginal symptoms    OB         Complaints: denies   Denies urgency, frequency, hematuria, leakage / change in stream, difficulty urinating.       BREAST  Complaints: denies   Denies: breast lump, breast tenderness, nipple discharge, skin color change, and skin lesion(s)    Pertinent Family Hx:   Family hx of breast cancer: no  Family hx of ovarian cancer: no  Family hx of colon cancer: no      GENERAL  PMH reviewed/updated and is as below.     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)        Past Surgical History:   Procedure Laterality Date    OR INDUCED  DILATION & EVACUATION N/A 2022    Procedure: DILATATION AND EVACUATION (D&E)  18 weeks;  Surgeon: Torrie Solares MD;  Location: [facility];  Service: Gynecology    TONSILLECTOMY      WISDOM TOOTH EXTRACTION           Current Outpatient Medications:     Kesimpta 20 MG/0.4ML SOAJ, Inject 20mg under the skin at weeks 0, 1, and 2., Disp: 1.2 mL, Rfl: 0    Kesimpta 20 MG/0.4ML SOAJ, Inject 20 mg under the skin every 28 days Starting at week 4, Disp: 0.4 mL, Rfl: 11    Allergies   Allergen Reactions    Latex Dermatitis    Pollen Extract Allergic Rhinitis       Social History     Socioeconomic History    Marital status: Single     Spouse name: Not on file    Number of children: Not on file    Years of education: Not on file    Highest education level: Not on file   Occupational History    Not on file   Tobacco Use    Smoking status: Every Day     Current packs/day: 0.50     Average packs/day: 0.5 packs/day for 3.7 years (1.8 ttl pk-yrs)     Types: Cigarettes     Start date:      Passive exposure: Current    Smokeless tobacco: Never    Tobacco  "comments:     Does smoke once in a while   Vaping Use    Vaping status: Never Used   Substance and Sexual Activity    Alcohol use: Not Currently     Comment: socially    Drug use: Never    Sexual activity: Yes     Partners: Male     Birth control/protection: None   Other Topics Concern    Not on file   Social History Narrative    Not on file     Social Determinants of Health     Financial Resource Strain: Not on file   Food Insecurity: Not on file   Transportation Needs: Not on file   Physical Activity: Not on file   Stress: Not on file   Social Connections: Not on file   Intimate Partner Violence: Not on file   Housing Stability: Not on file       Review of Systems     Constitutional: Negative.   Respiratory: Negative.    Cardiovascular: Negative   Gastrointestinal: Negative   Breasts: As noted above.   Genitourinary: As noted above.   Psychiatric: Negative     Objective      /94 (BP Location: Left arm, Patient Position: Sitting, Cuff Size: Large)   Ht 5' 5\" (1.651 m)   Wt 98.9 kg (218 lb)   LMP 08/23/2024 (Exact Date)   BMI 36.28 kg/m²     Physical Examination:    Patient appears well and is not in distress  Breasts are symmetrical without mass, tenderness, nipple discharge, skin changes or adenopathy.   Abdomen is soft and nontender without masses.   External genitals are normal without lesions or rashes.  Urethral meatus and urethra are normal  Bladder is normal to palpation  Vagina is normal without discharge or bleeding.   Cervix is normal without discharge or lesion.   Uterus is normal, mobile, nontender without palpable mass.  Adnexa are normal, nontender, without palpable mass.             "

## 2024-08-27 LAB
HPV HR 12 DNA CVX QL NAA+PROBE: NEGATIVE
HPV16 DNA CVX QL NAA+PROBE: NEGATIVE
HPV18 DNA CVX QL NAA+PROBE: NEGATIVE

## 2024-08-30 LAB
LAB AP GYN PRIMARY INTERPRETATION: NORMAL
Lab: NORMAL

## 2024-09-18 ENCOUNTER — TELEPHONE (OUTPATIENT)
Age: 39
End: 2024-09-18

## 2024-10-02 ENCOUNTER — OFFICE VISIT (OUTPATIENT)
Age: 39
End: 2024-10-02
Payer: COMMERCIAL

## 2024-10-02 VITALS
DIASTOLIC BLOOD PRESSURE: 80 MMHG | SYSTOLIC BLOOD PRESSURE: 108 MMHG | OXYGEN SATURATION: 100 % | WEIGHT: 220 LBS | HEART RATE: 75 BPM | BODY MASS INDEX: 36.65 KG/M2 | HEIGHT: 65 IN

## 2024-10-02 DIAGNOSIS — G35 MULTIPLE SCLEROSIS (HCC): Primary | ICD-10-CM

## 2024-10-02 PROCEDURE — G2212 PROLONG OUTPT/OFFICE VIS: HCPCS | Performed by: PSYCHIATRY & NEUROLOGY

## 2024-10-02 PROCEDURE — 99215 OFFICE O/P EST HI 40 MIN: CPT | Performed by: PSYCHIATRY & NEUROLOGY

## 2024-10-02 NOTE — PROGRESS NOTES
NEUROLOGY OUTPATIENT - NEW [facility] PATIENT VISIT NOTE        NAME: Pepper Hopson  MRN: [id number]  : 1985     TODAY'S DATE: 10/02/24         HISTORY OF PRESENT ILLNESS (HPI):    Ms. Hopson is a 38 y.o. female presenting with Multiple Sclerosis    MS HISTORY:  Symptoms onset:   Initial symptoms: sensory symptoms including left leg tingling, paresthesia, left half of her body, speech complaints. Shakiness all over her body. 4 days of blurred vision when she in college  MS diagnosis: 11 years back--Dr. Ingram ([facility])  Disease course at onset: Relapsing remitting Multiple Sclerosis   Current disease course: Relapsing remitting Multiple Sclerosis   Subsequent symptoms: A few relapse over the years.   Family history of MS: No  Spinal tap: She had a spinal tap. She did not had the OCB.   Prior DMT's for MS: Rebif (side effects from the injections), Tecfidera as she developed breakthrough disease. Ocrevus~considering getting pregnant.    Current DMT's for MS: Kesimpta  Safety labs: WNL  JCV test: Not indicated  NMO Ab:  never done   MOG Ab:never done   Last MRI of the brain:  2024-stable.   Last MRI of the C spine: none done recently in the system   Last MRI of the T spine: none done recently in the system.       Patient was previously established with Dr. PARDO and is coming in as a new NIC patient.       DMT: Kesimpta  Side effects: NA   Baseline safety labs:     New symptoms: no concerns or symptoms.   Progression: no concerning symptoms.   New MRI findings: last MRI brain at the start of the year was stable.       MS ROS:   Sensory symptoms (numbness, tingling and paresthesia): No complaints   Weakness: No complaints   Spasms: No complaints   Vision problems (loss of vision or double vision): No complaints   Ambulatory dysfunction: No complaints   Vertigo and Dizziness: once in while she would have some vertigo when she is in car, lasts few seconds and then goes away   Cognitive complaints:  She  feels that her memory is getting affected as well. She feels that she is not as sharp as she was before. (Works in [employer])   Speech complaints: No complaints     Fatigue: She does have fatigue. She feels that she is more tired. She also  think that it is situational. She would like to take naps.  She does not want to take a medicine.   Bladder symptoms:She does have some stress incontinence.   Bowel symptoms: No complaints     Lhermitte's sign: negative   Uhthoff's phenomenon:  positive  MS hugs like symptoms:  negative         OUTSIDE RECORDS:    historical medical records  Mrs. Forbes has presented to [facility] multiple sclerosis center for establishing care.  Patient has been diagnosed with multiple sclerosis 10-11 years ago with initial presentation was as tumefactive limiting lesion which later developed as multiple sclerosis.  Patient had tried several regimens including Rebif, Tecfidera as she developed breakthrough disease.  Patient had started ocrelizumab in 2022 but she got pregnant and unfortunately she required D&C due to fetal chromosomal abnormalities at 18 weeks of pregnancy.   Patient had multiple relapses, we personally viewed patient imaging available on today's evaluation, patient does have moderate burden of demyelination in her brain parenchyma with exceptional Reporting capacity has been noted and she does not have significant amount T1 weighted black holes except 1 in right periventricular area.   Considering patient continue pregnancy planning, we extensively discussed importance to choose the right time when patient can safely consider pregnancy.  Patient does receive another dose of ocrelizumab in January 2023 as she will be advised against considering a child within the next 4-6 months based on the half-life of Ocrevus.   CURRENT OUTPATIENT MEDICATIONS:    Pepper Hopson has a current medication list which includes the following prescription(s): kesimpta and kesimpta.      "  PHYSICAL EXAMINATION:   VITAL SIGNS:  height is 5' 5\" (1.651 m) and weight is 99.8 kg (220 lb). Her blood pressure is 108/80 and her pulse is 75. Her oxygen saturation is 100%.        NEUROLOGICAL EXAMINATION:    MENTAL STATUS EXAM: Alert, oriented to time place and person     CRANIAL NERVE EXAMINATION:  I Not tested   II Normal visual fields to finger counting.   II, Ill,  IV, VI Pupils were symmetric, briskly reactive. No afferent pupillary defect.  Eye movements are full without nystagmus. No ptosis.   V Facial sensation were intact   VII Facial movements were symmetrical    VIII Hearing was intact   IX, X Intact   XI Shoulder shrug and head turn was normal   XII Tongue protrusion is in the mid line.          MOTOR EXAM:        Arm Right  Left Leg Right  Left   Deltoid 5/5 5/5 lliopsoas 5/5 5/5   Biceps 5/5 5/5 Quads 5/5 5/5   Triceps 5/5 5/5 Hamstrings 5/5 5/5   Wrist Extension 5/5 5/5 Ankle Dorsi Flexion 5/5 5/5   Wrist Flexion 5/5 5/5 Ankle Plantar Flexion 5/5 5/5               DEEP TENDON REFLEXES:     Brachioradialis Biceps Triceps Patellar Achilles   Right 2+ 2+ 2+ 2+ 2+   Left 2+ 2+ 2+ 2+ 2+            SENSORY EXAMINATION:   Sensation to dull touch Intact  Sensation to temperature Intact    COORDINATION:   Normal finger to nose testing  Finger tapping test was normal    GAIT/STATION:   normal        DIAGNOSTIC STUDIES:   PERTINENT LABS:     Lab Results   Component Value Date    WBC 6.85 06/18/2024     Lab Results   Component Value Date    HGB 14.6 06/18/2024     Lab Results   Component Value Date     06/18/2024     Lab Results   Component Value Date    LYMPHSABS 2.02 06/18/2024     No results found for: \"LABLYMP\"  Lab Results   Component Value Date    EOSABS 0.40 06/18/2024     No components found for: \"NEUTROPHILS\"    No results found for: \"LABMONO\"         No results found for: \"LABGLUC\"  Lab Results   Component Value Date    CREATININE 0.43 (L) 06/18/2024     Lab Results   Component Value Date "    AST 16 06/18/2024     Lab Results   Component Value Date    ALT 17 06/18/2024     Lab Results   Component Value Date    ALKPHOS 65 06/18/2024     Lab Results   Component Value Date    AST 16 06/18/2024             Lab Results   Component Value Date    HEPBSAG Non-reactive 06/18/2024    HEPCAB Non-reactive 06/18/2024            NEUROIMAGING:        MRI brain 1/30/2024 as per radiology  IMPRESSION:     No significant interval change since prior examination. Stable scattered white matter FLAIR signal hyperintensity consistent with the clinical history of demyelinating disease. No definite new lesions identified.                 ASSESSMENT AND PLAN:    Ms. Hopson is a 38 y.o.female  presenting with [facility] care regarding her previous diagnosis of multiple sclerosis.  Briefly the patient was diagnosed in 2012 based on her neurological symptoms and MRI brain imaging.  She also underwent a CSF analysis which came back negative but initial MRI of the brain was concerning for tumefactive lesion.  She was initially started on Rebif but due to injection site reactions was switched over to Tecfidera which she had breakthrough disease.  She was switched over to Ocrevus in 2022 but she got pregnant and unfortunately required D&C due to fetal chromosomal abnormalities at 18 weeks of pregnancy.  Most recently she has been started on Kesimpta and has been tolerating the medication well without any significant side effects nor she mentions any new neurological symptoms that would be concerning for any clinical relapse.  Her last MRI of the brain from January 2024 was stable      MULTIPLE SCLEROSIS PLAN:  Nonactive/nonprogressive phase of the disease    Disease Modifying Therapy:   Continue with Kesimpta as your disease modifying therapy for MS. Side effects have already been discussed with the patient in detail.   Safety labs:   stable  Neuroimaging:   Would recommend getting MRI of the brain in about 6 months.  She would  like to hold off to the MRI of the cervical and thoracic spine  Supplements:   Vit D and other supplements as recommended. Written instructions were given. Vit D goal is above 40.   Exercise:   Regular exercise was recommended (At least 150 min a week)  Diet:   Mediterranean diet ( instructions were given)  Fatigue:    Discussed about the role of Provigil and MS fatigue but she would like to review it before making any final decisions    Family planning in light of her taking Kesimpta was also discussed in detail.             Return in about 7 months (around 5/2/2025), or Telemedicine.       The management plan was discussed in detail with the patient and all questions were answered.     Ms. Hopson was encouraged to contact our office with any questions or concerns and to contact the clinic or go to the nearest emergency room if symptoms change or worsen.       I have spent a total of 62 min in reviewing and/or ordering tests, medications, or procedures, performing an examination or evaluation, reviewing pertinent history, counseling and educating the patient, referring and/or communicating with other health care professionals, documenting in the EMR and general coordination of care of Ms. Hopson  today.           Zeke Stephens MD.   Staff Neurologist,   Neuroimmunology and Neuroinfectious disease  10/02/24     This report has been created through the use of voice recognition/text compilation software.  Typographical and content errors may occur with this process.  While efforts are made to detect and correct such errors, in some cases errors will persist.  For this reason, wording in this document should be considered in the proper context and not strictly verbatim.  If, when reviewing the document, an error is discovered, please let the office know at [phone]

## 2024-10-02 NOTE — PROGRESS NOTES
Patient ID: Pepper Hopson is a 38 y.o. female.    Assessment/Plan:    No problem-specific Assessment & Plan notes found for this encounter.       {Assess/PlanSmartLinks:22322}       Subjective:    HPI    {. [facility] Neurology HPI texts:61129}    {Common ambulatory SmartLinks:09275}         Objective:    not currently breastfeeding.    Physical Exam    Neurological Exam    Gait    T25FW,8.99 SECONDS.        ROS:    Review of Systems

## 2024-10-02 NOTE — PATIENT INSTRUCTIONS
"Mediterranean diet -- There is no single definition of a Mediterranean diet, but such diets are typically high in fruits, vegetables, whole grains, beans, nuts, and seeds; include olive oil as an important source of monounsaturated fat; and allow low to moderate wine consumption. There are typically low to moderate amounts of fish, poultry, and dairy products, with little red meat. The Mediterranean diet is associated with several health benefits; however, it remains uncertain which components of the Mediterranean diet offer the protective benefit or if the benefits result from an aggregation of effects.    The Mediterranean diet incorporates the basics of healthy eating -- plus a splash of flavorful olive oil and perhaps a glass of red wine -- among other components characterizing the traditional cooking style of countries bordering the [place].  Most healthy diets include fruits, vegetables, fish and whole grains, and limit unhealthy fats. While these parts of a healthy diet are tried-and-true, subtle variations or differences in proportions of certain foods may make a difference in your risk of heart disease.     Benefits of the Mediterranean diet  Research has shown that the traditional Mediterranean diet reduces the risk of heart disease. The diet has been associated with a lower level of oxidized low-density lipoprotein (LDL) cholesterol -- the \"bad\" cholesterol that's more likely to build up deposits in your arteries.  In fact, a meta-analysis of more than 1.5 million healthy adults demonstrated that following a Mediterranean diet was associated with a reduced risk of cardiovascular mortality as well as overall mortality.  The Mediterranean diet is also associated with a reduced incidence of cancer, and Parkinson's and Alzheimer's diseases. Women who eat a Mediterranean diet supplemented with extra-virgin olive oil and mixed nuts may have a reduced risk of breast cancer.  For these reasons, most if " not all major scientific organizations encourage healthy adults to adapt a style of eating like that of the Mediterranean diet for prevention of major chronic diseases.     Key components of the Mediterranean diet  The Mediterranean diet emphasizes:  Eating primarily plant-based foods, such as fruits and vegetables, whole grains, legumes and nuts   Replacing butter with healthy fats such as olive oil and canola oil   Using herbs and spices instead of salt to flavor foods   Limiting red meat to no more than a few times a month   Eating fish and poultry at least twice a week   Enjoying meals with family and friends   Drinking red wine in moderation (optional)   Getting plenty of exercise      If you're looking for a heart-healthy eating plan, the Mediterranean diet might be right for you. The Mediterranean diet incorporates the basics of healthy eating -- plus a splash of flavorful olive oil and perhaps even a glass of red wine -- among other components characterizing the traditional cooking style of countries bordering the [place].  Most healthy diets include fruits, vegetables, fish and whole grains, and limit unhealthy fats. While these parts of a healthy diet remain tried-and-true, subtle variations or differences in proportions of certain foods may make a difference in your risk of heart disease.      Research has shown that the traditional Mediterranean diet reduces the risk of heart disease. In fact, an analysis of more than 1.5 million healthy adults demonstrated that following a Mediterranean diet was associated with a reduced risk of death from heart disease and cancer, as well as a reduced incidence of Parkinson's and Alzheimer's diseases.      The Dietary Guidelines for Americans recommends the Mediterranean diet as an eating plan that can help promote health and prevent disease. And the Mediterranean diet is one your whole family can follow for good health.   The Mediterranean diet  "emphasizes:  Eating primarily plant-based foods, such as fruits and vegetables, whole grains,  legumes and nuts  Replacing butter with healthy fats, such as olive oil  Using herbs and spices instead of salt to flavor foods  Limiting red meat to no more than a few times a month  Eating fish and poultry at least twice a week  Drinking red wine in moderation (optional)     The diet also recognizes the importance of being physically active, and enjoying meals with family and friends.     The Mediterranean diet traditionally includes fruits, vegetables and grains. For example, residents of [facility] average six or more servings a day of antioxidant rich fruits and vegetables.     Grains in the Mediterranean region are typically whole grain and usually contain very few unhealthy trans fats, and bread is an important part of the diet. However, throughout the Mediterranean region, bread is eaten plain or dipped in olive oil -- not eaten with butter or margarine, which contains saturated or trans fats.      Nuts are another part of a healthy Mediterranean diet. Nuts are high in fat, but most of the fat is healthy. Because nuts are high in calories, they should not be eaten in large amounts -- generally no more than a handful a day. For the best nutrition, avoid candied or honey-roasted and heavily salted nuts.  The focus of the Mediterranean diet isn't on limiting total fat consumption, but rather on choosing healthier types of fat. The Mediterranean diet discourages saturated fats and hydrogenated oils (trans fats), both of which contribute to heart disease.  The Mediterranean diet features olive oil as the primary source of fat. Olive oil is mainly monounsaturated fat -- a type of fat that can help reduce low-density lipoprotein (LDL) cholesterol levels when used in place of saturated or trans fats. \"Extra-virgin\" and \"virgin\" olive oils (the least processed forms) also contain the highest levels of protective plant compounds " that provide antioxidant effects.  Canola oil and some nuts contain the beneficial linolenic acid (a type of omega-3 fatty acid) in addition to healthy unsaturated fat. Omega-3 fatty acids lower triglycerides, decrease blood clotting, and are associated with decreased incidence of sudden heart attacks, improve the health of your blood vessels, and help moderate blood pressure.      Fatty fish -- such as mackerel, lake trout, herring, sardines, albacore tuna and salmon -- are rich sources of omega-3 fatty acids. Fish is eaten on a regular basis in the Mediterranean diet.     The health effects of alcohol have been debated for many years, and some doctors are reluctant to encourage alcohol consumption because of the health consequences of excessive drinking. However, alcohol -- in moderation -- has been associated with a reduced risk of heart disease in some research studies.  The Mediterranean diet typically includes a moderate amount of wine, usually red wine. This means no more than 5 ounces (148 milliliters) of wine daily for women of all ages and men older than age 65 and no more than 10 ounces (296 milliliters) of wine daily for younger men. More than this may increase the risk of health problems, including increased risk of certain types of cancer.   If you're unable to limit your alcohol intake to the amounts defined above, if you have a personal or family history of alcohol abuse, or if you have heart or liver disease, refrain from drinking wine or any other alcohol.     The Mediterranean diet is a delicious and healthy way to eat. Many people who switch to this style of eating say they'll never eat any other way. Here are some specific steps to get you started:   Eat your veggies and fruits -- and switch to whole grains. [place] of plant foods should make up the majority of your meals. They should be minimally processed -- fresh and whole are best. Include veggies and fruits in every meal and eat them for  snacks as well. Switch to whole-grain bread and cereal, and begin to eat more whole-grain rice and pasta products. Keep baby carrots, apples and bananas on hand for quick, satisfying snacks. Fruit salads are a wonderful way to eat a variety of healthy fruit.  Go nuts. Nuts and seeds are good sources of fiber, protein and healthy fats. Keep almonds, cashews, pistachios and walnuts on hand for a quick snack. Choose natural peanut butter, rather than the kind with hydrogenated fat added. Try blended sesame seeds (tahini) as a dip or spread for bread.  Pass on the butter. Try olive or canola oil as a healthy replacement for butter or margarine. Lightly drizzle it over vegetables. After cooking pasta, add a touch of olive oil, some garlic and green onions for flavoring. Dip bread in flavored olive oil or lightly spread it on whole-grain bread for a tasty alternative to butter. Try tahini as a dip or spread for bread too.  Spice it up. Herbs and spices make food tasty and can  for salt and fat in recipes.  Go fish. Eat fish at least twice a week. Fresh or water-packed tuna, salmon, trout, mackerel and herring are healthy choices. [place], bake or broil fish for great taste and easy cleanup. Avoid breaded and fried fish.  Rein in the red meat. Limit red meat to no more than a few times a month. Substitute fish and poultry for red meat. When choosing red meat, make sure it's lean and keep portions small (about the size of a deck of cards). Also avoid sausage, garduno and other high-fat, processed meats.  Choose low-fat dairy. Limit higher fat dairy products, such as whole or 2 percent milk, cheese and ice cream. Switch to skim milk, fat-free yogurt and low-fat cheese.     Many patients ask us about exercise and MS. Some common questions include: “should I exercise?”, “how often, for how long, how strenuous?”, and “what should I do?”. The answers are different for everybody however for the most part, exercise is good for  people with MS.    A 2010 study in [place] showed that fatigue, mood and quality of life improved after 12 weeks of progressive resistance training. This benefit was maintained for an additional 12 weeks after the end of their training. [1]    Another study at [facility] in 2004 randomized MS patients into 3 groups: one that did Susanne yoga, one that worked out on a stationary bike, or a control group. The yoga group showed a significant improvement in fatigue compared to controls. [2]    Everybody is different so the type of exercise you do, how often you do it and how intense it is should be tailored to you and based on your abilities. Often times we make referrals to physical therapy where an individualized program can be developed to focus on each person's needs such as walking, managing spasticity, or fatigue. It's important to stick with a program as often the results are not felt until a few weeks into the program.     Many MS patients have worsening of their symptoms during the summer or when it's hot outside. This intolerance to heat is called Uhthoff's Phenomena. For these patients it's recommended to exercise in the morning or in the evening when the sun is not at its peak, or inside in an air conditioned environment. Aquatic exercise such as water aerobics or swimming are beneficial in patients with MS. If exercising in the heat is the only option, there are special clothes designed to keep the body cool including cooling neck wraps and vests. If all of these measures still don't work and symptoms are worse during exercise, it's recommended to discuss this with your neurologist.   Some patients say they are too tired to work out. Fatigue is an extremely common symptom in MS with up to 80% of patients experiencing it. A number of studies have shown that regular exercise, usually with some aerobic component, helps with MS-related fatigue. [3] If you feel too tired to work out you  "could try a restorative or yin yoga class. Yin classes tend to rebuild your energy levels and calm the nervous system. These two types of classes involve mostly stretching and relaxing. If you are new to yoga, it might be best to take a few private classes or go to a local yoga studio. However there are also some great resources online including yogaglo.com and yogavibes.com which both offer a 15-day free trial period to watch yoga videos at home. On these sites you can choose a beginner level 1 class or the style and teacher of your choice. If yoga is not your thing, even going for a short walk every day can improve your energy levels and overall health.     Next time you visit with your neurologist, be sure to discuss exercise and how you can incorporate it into your life.     References:  1. FRANCHESCA Isabel, et al. \"Fatigue, mood and quality of life improve in MS patients after progressive resistance training.\" Multiple sclerosis (2010).  2. SHAQUILLE Gupta, et al. \"Randomized controlled trial of yoga and exercise in multiple sclerosis.\" Neurology 62.11 (2004): 3462-6590.  3. http://my.[facility].org/neurological_institute/[place]-multiple-sclerosis/patient-education/hic-fatigue-in-multiple-sclerosis.aspx]       Vit D 125 mcg (5000 international units) every day.   Vitamin B 12 1000 mcg every day.   Vitamin B 1 100 mg every day.   Biotin 10,000 mcg every day.   Alpha lipoic acid 600 mg every day  Fish oil/Omega 3-one capsule every day.   Coq-10 100 mg every day.         If you are not able to take all of these Vitamins and supplements daily, you can try to take the first 4 daily.            These symptoms are NOT suggestive for relapses (exaecerbations or flare-ups): headache, stomach pain, nausea, vomiting, sore throat, joint pain, muscle pain, rash, cough, back pain, flu like symptoms.      These symptoms can be MS relapses. Please note they are all neurological symptoms:    - Numbness and tingling affecting " a part of the body and last continuously for days or weeks (on and off symptoms are not suggestive for MS relapses).       - Muscle weakness in arms (more so in legs) that lasts continuously for days or weeks. Short-lasting symptoms and on and off symptoms are not suggestive for an MS relapse.     - Face numbness and weakness that last continuously for days or weeks. Short-lasting, intermittent symptoms or face or eye twitching are not MS relapses.       - Spinning sensation (vertigo), that lasts continuously for days or weeks. Lightheadedness, positional dizziness and short-lasting dizziness are not MS symptoms.     - Imbalance while walking or incoordination in hands and arms that lasts continuously for days or weeks.    - Major, new onset loss of vision (or major blurry vision) in ONE eye (not both eyes), that happen relatively suddenly and worsen over few days and is usually associated with eye pain (with eye movement) can be an MS relapse. Short-lasting change in vision or very long change in vision that can be corrected with glasses ARE NOT MS symptoms.     - Double vision while both eyes are open, which goes away with closing one of the eyes and last days or weeks; can be a symptoms of MS relapse. Momentary double vision, or double vision which still is present with closing either eye; IS NOT an MS symptom.

## 2025-02-19 ENCOUNTER — OFFICE VISIT (OUTPATIENT)
Dept: BARIATRICS | Facility: CLINIC | Age: 40
End: 2025-02-19
Payer: COMMERCIAL

## 2025-02-19 VITALS
SYSTOLIC BLOOD PRESSURE: 124 MMHG | DIASTOLIC BLOOD PRESSURE: 86 MMHG | HEIGHT: 65 IN | BODY MASS INDEX: 37.99 KG/M2 | HEART RATE: 101 BPM | OXYGEN SATURATION: 98 % | WEIGHT: 228 LBS

## 2025-02-19 DIAGNOSIS — E66.09 CLASS 2 OBESITY DUE TO EXCESS CALORIES WITHOUT SERIOUS COMORBIDITY WITH BODY MASS INDEX (BMI) OF 37.0 TO 37.9 IN ADULT: Primary | ICD-10-CM

## 2025-02-19 DIAGNOSIS — E66.812 CLASS 2 OBESITY DUE TO EXCESS CALORIES WITHOUT SERIOUS COMORBIDITY WITH BODY MASS INDEX (BMI) OF 37.0 TO 37.9 IN ADULT: Primary | ICD-10-CM

## 2025-02-19 PROCEDURE — 99203 OFFICE O/P NEW LOW 30 MIN: CPT

## 2025-02-19 RX ORDER — TIRZEPATIDE 2.5 MG/.5ML
2.5 INJECTION, SOLUTION SUBCUTANEOUS WEEKLY
Qty: 2 ML | Refills: 0 | Status: SHIPPED | OUTPATIENT
Start: 2025-02-19 | End: 2025-03-19

## 2025-02-19 NOTE — ASSESSMENT & PLAN NOTE
- Discussed options of HealthyCORE-Intensive Lifestyle Intervention Program, Very Low Calorie Diet-VLCD, and Conservative Program and the role of weight loss medications.  - Patient is interested in pursuing Conservative Program and follow up visits with medical weight management provider.  - Explained the importance of making lifestyle changes in addition to starting any anti-obesity medications.   - Initial weight loss goal of 5-10% weight loss for improved health. Weight loss can improve patient's co-morbid conditions and/or prevent weight-related complications.  - Weight is not at goal and patient has been unable to achieve a meaningful weight loss above 5% using various programs and tools for more than 6 months  - Labs reviewed from 6/24    General Recommendations:  Nutrition:  Eat breakfast daily.  Do not skip meals.      Food log (ie.) www.Nurego.com, sparkpeople.com, loseit.com, calorieking.com, etc.     Practice mindful eating.  Be sure to set aside time to eat, eat slowly, and savor your food.     Hydration:    At least 64oz of water daily.  No sugar sweetened beverages.  No juice (eat the fruit instead).     Exercise:  Studies have shown that the ideal exercise goal is somewhere between 150 to 300 minutes of moderate intensity exercise a week.  Start with exercising 10 minutes every other day and gradually increase physical activity with a goal of at least 150 minutes of moderate intensity exercise a week, divided over at least 3 days a week.  An example of this would be exercising 30 minutes a day, 5 days a week.  Resistance training can increase muscle mass and increase our resting metabolic rate.   FULL BODY resistance training is recommended 2-3 times a week.  Do not do this on consecutive days to allow for muscle recovery.     Aim for a bare minimum 5000 steps, even on days you do not exercise.     Monitoring:   Weigh yourself daily.  If this causes undue stress, then just weigh yourself once a  week.  Weigh yourself the same time of the day with the same amount of clothing on.  Preferably this should be done after waking up, before you eat, and with no clothing or minimal clothing on.     Specific Goals:  Calorie goal: 1300 evelyne/day (Provided with meal plan to follow). Discussed her weightloss and nutrition and she is very optimistic about making changes to improve her health. She is:  Encouraged to start tracking food.   Eat at least 3 meals per day with a focus on protein. Do not skip meals. Protein rich snacks discussed and protein shake options discussed.  Exercise 1-2 days per week to start for 10-15 minutes per day and increase from there, also adding strength training with resistance bands or light weights.   Hydrate with 64 oz of water daily and cut out sugary beverages.   Discussed medications: Patient denies personal and family history of  pancreatitis, thyroid cancer, MEN-2 tumors.  Denies any hx of glaucoma, seizures, kidney stones, gallstones, or gastroparesis.   Denies Hx of CAD, PAD, palpitations, arrhythmia.   Denies uncontrolled anxiety or depression, suicidal behavior or thinking.   Denies insomnia or sleep disturbance.  No CI to medications  She is interested in Zepbound. Zepbound Instructions reviewed:  - Begin Zepbound 2.5 mg subcutaneously once a week. Dose changes may occur after 4 doses if medication is tolerated. You will be assessed prior to each dose change to make sure you are tolerating the medication well.  - Please message me when you have 2 pens left from the prescription so there are no lapses in treatment.  - If you have been off the medication for more than 14 days please contact the office as you will need to restart the titration at the starting dose again to avoid significant side effects or adverse events.  - Visit [email or web address] for further information/injection instructions.   -Please eat small frequent meals to help reduce nausea. Lemon water and saltine crackers may  help with this.   - Side effects of Zepbound discussed: nausea, vomiting, diarrhea, and constipation. If you experience fever, nausea/vomiting, and pain radiating to your back this may be a sign of pancreatitis. Please go to the emergency room if this occurs.  - If on oral birth control a 2nd method of birth control is recommended during the 1st 8 weeks of therapy and for 4 weeks after any dosage change.   - Patient understands the side effects of the medication and proper administration. Patient agrees with the treatment plan and all questions were answered. Medication consent was reviewed today and all questions were answered.  Patient agreed with all bulleted points.  Consent was signed, scanned into chart and patient was provided with a copy for their records.

## 2025-02-19 NOTE — PROGRESS NOTES
Assessment/Plan:    Class 2 obesity due to excess calories without serious comorbidity with body mass index (BMI) of 37.0 to 37.9 in adult  - Discussed options of HealthyCORE-Intensive Lifestyle Intervention Program, Very Low Calorie Diet-VLCD, and Conservative Program and the role of weight loss medications.  - Patient is interested in pursuing Conservative Program and follow up visits with medical weight management provider.  - Explained the importance of making lifestyle changes in addition to starting any anti-obesity medications.   - Initial weight loss goal of 5-10% weight loss for improved health. Weight loss can improve patient's co-morbid conditions and/or prevent weight-related complications.  - Weight is not at goal and patient has been unable to achieve a meaningful weight loss above 5% using various programs and tools for more than 6 months  - Labs reviewed from 6/24    General Recommendations:  Nutrition:  Eat breakfast daily.  Do not skip meals.      Food log (ie.) www.3Leaf.com, sparkpeople.com, loseit.com, calorieking.com, etc.     Practice mindful eating.  Be sure to set aside time to eat, eat slowly, and savor your food.     Hydration:    At least 64oz of water daily.  No sugar sweetened beverages.  No juice (eat the fruit instead).     Exercise:  Studies have shown that the ideal exercise goal is somewhere between 150 to 300 minutes of moderate intensity exercise a week.  Start with exercising 10 minutes every other day and gradually increase physical activity with a goal of at least 150 minutes of moderate intensity exercise a week, divided over at least 3 days a week.  An example of this would be exercising 30 minutes a day, 5 days a week.  Resistance training can increase muscle mass and increase our resting metabolic rate.   FULL BODY resistance training is recommended 2-3 times a week.  Do not do this on consecutive days to allow for muscle recovery.     Aim for a bare minimum 5000  steps, even on days you do not exercise.     Monitoring:   Weigh yourself daily.  If this causes undue stress, then just weigh yourself once a week.  Weigh yourself the same time of the day with the same amount of clothing on.  Preferably this should be done after waking up, before you eat, and with no clothing or minimal clothing on.     Specific Goals:  Calorie goal: 1300 evelyne/day (Provided with meal plan to follow). Discussed her weightloss and nutrition and she is very optimistic about making changes to improve her health. She is:  Encouraged to start tracking food.   Eat at least 3 meals per day with a focus on protein. Do not skip meals. Protein rich snacks discussed and protein shake options discussed.  Exercise 1-2 days per week to start for 10-15 minutes per day and increase from there, also adding strength training with resistance bands or light weights.   Hydrate with 64 oz of water daily and cut out sugary beverages.   Discussed medications: Patient denies personal and family history of  pancreatitis, thyroid cancer, MEN-2 tumors.  Denies any hx of glaucoma, seizures, kidney stones, gallstones, or gastroparesis.   Denies Hx of CAD, PAD, palpitations, arrhythmia.   Denies uncontrolled anxiety or depression, suicidal behavior or thinking.   Denies insomnia or sleep disturbance.  No CI to medications  She is interested in Zepbound. Zepbound Instructions reviewed:  - Begin Zepbound 2.5 mg subcutaneously once a week. Dose changes may occur after 4 doses if medication is tolerated. You will be assessed prior to each dose change to make sure you are tolerating the medication well.  - Please message me when you have 2 pens left from the prescription so there are no lapses in treatment.  - If you have been off the medication for more than 14 days please contact the office as you will need to restart the titration at the starting dose again to avoid significant side effects or adverse events.  - Visit Zepbound.com  for further information/injection instructions.   -Please eat small frequent meals to help reduce nausea. Lemon water and saltine crackers may help with this.   - Side effects of Zepbound discussed: nausea, vomiting, diarrhea, and constipation. If you experience fever, nausea/vomiting, and pain radiating to your back this may be a sign of pancreatitis. Please go to the emergency room if this occurs.  - If on oral birth control a 2nd method of birth control is recommended during the 1st 8 weeks of therapy and for 4 weeks after any dosage change.   - Patient understands the side effects of the medication and proper administration. Patient agrees with the treatment plan and all questions were answered. Medication consent was reviewed today and all questions were answered.  Patient agreed with all bulleted points.  Consent was signed, scanned into chart and patient was provided with a copy for their records.          Pepper was seen today for consult.    Diagnoses and all orders for this visit:    Class 2 obesity due to excess calories without serious comorbidity with body mass index (BMI) of 37.0 to 37.9 in adult  -     tirzepatide (Zepbound) 2.5 mg/0.5 mL auto-injector; Inject 0.5 mL (2.5 mg total) under the skin once a week for 28 days           Total time spent reviewing chart, interviewing patient, examining patient, discussing plan, answering all questions, and documentin min, with >50% face-to-face time spent counseling patient on nonsurgical interventions for the treatment of excess weight. Discussed in detail nonsurgical options including intensive lifestyle intervention program, very low-calorie diet program and conservative program.  Discussed the role of weight loss medications.  Counseled patient on diet behavior and exercise modification for weight loss.    Follow up in approximately 2 months with Non-Surgical Physician/Advanced Practitioner.    Subjective:   Chief Complaint   Patient presents with     Consult     Initial visit with Kiana spangler.  Neck 14  Waist 39.5  SB=       Patient ID: Pepper Hopson  is a 39 y.o. female with excess weight/obesity here to pursue weight management.  Previous notes and records have been reviewed.    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)      Past Surgical History:   Procedure Laterality Date    ND INDUCED  DILATION & EVACUATION N/A 2022    Procedure: DILATATION AND EVACUATION (D&E)  18 weeks;  Surgeon: Torrie Solares MD;  Location: [facility];  Service: Gynecology    TONSILLECTOMY      WISDOM TOOTH EXTRACTION         HPI:  Wt Readings from Last 20 Encounters:   25 103 kg (228 lb)   10/02/24 99.8 kg (220 lb)   24 98.9 kg (218 lb)   24 96 kg (211 lb 9.6 oz)   24 95.7 kg (211 lb)   24 94.9 kg (209 lb 3.2 oz)   24 90.7 kg (200 lb)   23 93.4 kg (205 lb 12.8 oz)   23 92.6 kg (204 lb 3.2 oz)   23 93 kg (205 lb)   23 92.1 kg (203 lb)   22 92.1 kg (203 lb)   22 92 kg (202 lb 13.2 oz)   22 92.1 kg (203 lb)   22 91.4 kg (201 lb 6.4 oz)   10/21/22 90.5 kg (199 lb 9.6 oz)   10/04/22 87.5 kg (193 lb)       Patient presents today to medical weight management office for consult. She has struggled with her weight for most of her life. Since about 18 years old she has been dieting. She was able to lose 85 pounds on her own at one point and then gained most of it back.       Starting MWM weight: 228 lbs   Starting BMI: 37  Starting Waist Measurement: 39.5 inches   Goal weight: 140s     Obesity/Excess Weight:  Severity: Severe  Onset:  Since 14 years old     Modifiers: Diet and Exercise and Commercial Weight Loss Programs-ie. Weight Watchers, Vivian Mitchell, Nutrisystem, etc.  Contributing factors: Poor Food Choices, Insufficient Physical Activity, Stress/Emotional Eating, Binge Eating, Lack of knowledge of appropriate  lifestyle changes, and Insufficient time to make appropriate lifestyle changes  Associated symptoms: comorbid conditions, fatigue, increased joint pain, decreased exercise capacity, body image issues, decreased self esteem, decreased mobility, depression, inability to do certain activities, and clothes do not fit    Diet recall:  B: Skips sometimes or yogurt or toast or half a bagel   S: a bite of a carb item   L: Left overs from dinner or sandwich   S: a bite of something usually a carb   D: Protein starch or veggie   S: chips or sweets   Take out frequency: 3xs a week     Hydration: Water, sugar free sodas with dinner, sugar fruit punch or something   Alcohol: rarely   Smoking: yes   Exercise: walking dogs / house cleaning   Occupation: Working from home   Sleep: 6-8 hours   STOP ban/8    Colonoscopy: na  Mammogram: ordered    The following portions of the patient's history were reviewed and updated as appropriate: allergies, current medications, past family history, past medical history, past social history, past surgical history, and problem list.    Family History   Problem Relation Age of Onset    No Known Problems Mother     Diabetes Father     No Known Problems Maternal Grandmother     Prostate cancer Maternal Grandfather     Dementia Maternal Grandfather     No Known Problems Paternal Grandmother     Prostate cancer Paternal Grandfather         Prostate Cancer    Diabetes Paternal Grandfather     Breast cancer Neg Hx     Colon cancer Neg Hx     Cervical cancer Neg Hx     Endometrial cancer Neg Hx     Uterine cancer Neg Hx     Ovarian cancer Neg Hx         Review of Systems   Constitutional:  Negative for fatigue.   HENT:  Negative for sore throat.    Respiratory:  Negative for cough and shortness of breath.    Cardiovascular:  Negative for chest pain, palpitations and leg swelling.   Gastrointestinal:  Negative for abdominal pain, constipation, diarrhea, nausea and vomiting.   Genitourinary:  Negative  "for dysuria.   Musculoskeletal:  Negative for arthralgias and back pain.   Skin:  Negative for rash.   Neurological:  Negative for headaches.   Psychiatric/Behavioral:  Negative for dysphoric mood. The patient is not nervous/anxious.        Objective:  /86 (BP Location: Left arm, Patient Position: Sitting, Cuff Size: Large)   Pulse 101   Ht 5' 5\" (1.651 m)   Wt 103 kg (228 lb)   LMP 02/19/2025   SpO2 98%   BMI 37.94 kg/m²     Physical Exam  Vitals and nursing note reviewed.   Constitutional:       Appearance: Normal appearance. She is obese.   HENT:      Head: Normocephalic.   Pulmonary:      Effort: Pulmonary effort is normal.   Neurological:      Mental Status: She is oriented to person, place, and time.   Psychiatric:         Mood and Affect: Mood normal.         Behavior: Behavior normal.         Thought Content: Thought content normal.         Judgment: Judgment normal.              Labs and Imaging  Recent labs and imaging have been personally reviewed.  Lab Results   Component Value Date    WBC 6.85 06/18/2024    HGB 14.6 06/18/2024    HCT 45.0 06/18/2024    MCV 90 06/18/2024     06/18/2024     Lab Results   Component Value Date    SODIUM 137 06/18/2024    K 4.2 06/18/2024     06/18/2024    CO2 25 06/18/2024    AGAP 8 06/18/2024    BUN 9 06/18/2024    CREATININE 0.43 (L) 06/18/2024    GLUF 90 06/18/2024    CALCIUM 9.0 06/18/2024    AST 16 06/18/2024    ALT 17 06/18/2024    ALKPHOS 65 06/18/2024    TP 6.8 06/18/2024    TBILI 0.36 06/18/2024    EGFR 131 06/18/2024     Lab Results   Component Value Date    HGBA1C 5.0 01/24/2023     Lab Results   Component Value Date    JTM7YWNZGWIR 1.068 06/18/2024     Lab Results   Component Value Date    CHOLESTEROL 152 01/24/2023     Lab Results   Component Value Date    HDL 50 01/24/2023     Lab Results   Component Value Date    TRIG 85 01/24/2023     Lab Results   Component Value Date    LDLCALC 85 01/24/2023     "

## 2025-03-05 ENCOUNTER — PATIENT MESSAGE (OUTPATIENT)
Dept: BARIATRICS | Facility: CLINIC | Age: 40
End: 2025-03-05

## 2025-03-09 ENCOUNTER — HOSPITAL ENCOUNTER (OUTPATIENT)
Dept: MRI IMAGING | Facility: HOSPITAL | Age: 40
Discharge: HOME | End: 2025-03-09
Attending: PSYCHIATRY & NEUROLOGY
Payer: COMMERCIAL

## 2025-03-09 DIAGNOSIS — G35 MULTIPLE SCLEROSIS (HCC): ICD-10-CM

## 2025-03-09 PROCEDURE — 70553 MRI BRAIN STEM W/O & W/DYE: CPT

## 2025-03-09 PROCEDURE — A9585 GADOBUTROL INJECTION: HCPCS | Performed by: PSYCHIATRY & NEUROLOGY

## 2025-03-09 RX ORDER — GADOBUTROL 604.72 MG/ML
10 INJECTION INTRAVENOUS
Status: COMPLETED | OUTPATIENT
Start: 2025-03-09 | End: 2025-03-09

## 2025-03-09 RX ADMIN — GADOBUTROL 10 ML: 604.72 INJECTION INTRAVENOUS at 10:02

## 2025-03-18 ENCOUNTER — RESULTS FOLLOW-UP (OUTPATIENT)
Age: 40
End: 2025-03-18

## 2025-03-24 ENCOUNTER — TELEPHONE (OUTPATIENT)
Dept: BARIATRICS | Facility: CLINIC | Age: 40
End: 2025-03-24

## 2025-03-24 NOTE — TELEPHONE ENCOUNTER
Reason for call:   [x] Prior Auth  [] Other:     Caller:  [x] Patient  [] Pharmacy  Name:   Address:   Callback Number:     Medication: tirzepatide (Zepbound) 2.5 mg/0.5 mL auto-injector     Dose/Frequency: Inject 0.5 mL (2.5 mg total) under the skin once a week for 28 days     Quantity: 2mL    Ordering Provider:   [] PCP/Provider -   [x] Speciality/Provider - Kiana Abreu

## 2025-04-28 ENCOUNTER — OFFICE VISIT (OUTPATIENT)
Dept: FAMILY MEDICINE CLINIC | Facility: CLINIC | Age: 40
End: 2025-04-28
Payer: COMMERCIAL

## 2025-04-28 ENCOUNTER — APPOINTMENT (OUTPATIENT)
Dept: LAB | Facility: CLINIC | Age: 40
End: 2025-04-28
Payer: COMMERCIAL

## 2025-04-28 VITALS
DIASTOLIC BLOOD PRESSURE: 80 MMHG | BODY MASS INDEX: 37.49 KG/M2 | SYSTOLIC BLOOD PRESSURE: 122 MMHG | RESPIRATION RATE: 18 BRPM | HEIGHT: 65 IN | OXYGEN SATURATION: 99 % | TEMPERATURE: 97.3 F | WEIGHT: 225 LBS | HEART RATE: 62 BPM

## 2025-04-28 DIAGNOSIS — Z00.00 ENCOUNTER FOR WELL ADULT EXAM WITHOUT ABNORMAL FINDINGS: ICD-10-CM

## 2025-04-28 DIAGNOSIS — Z00.00 ENCOUNTER FOR WELL ADULT EXAM WITHOUT ABNORMAL FINDINGS: Primary | ICD-10-CM

## 2025-04-28 LAB
BASOPHILS # BLD AUTO: 0.06 THOUSANDS/ÂΜL (ref 0–0.1)
BASOPHILS NFR BLD AUTO: 1 % (ref 0–1)
EOSINOPHIL # BLD AUTO: 0.34 THOUSAND/ÂΜL (ref 0–0.61)
EOSINOPHIL NFR BLD AUTO: 4 % (ref 0–6)
ERYTHROCYTE [DISTWIDTH] IN BLOOD BY AUTOMATED COUNT: 14 % (ref 11.6–15.1)
HCT VFR BLD AUTO: 43.4 % (ref 34.8–46.1)
HGB BLD-MCNC: 14.5 G/DL (ref 11.5–15.4)
IMM GRANULOCYTES # BLD AUTO: 0.03 THOUSAND/UL (ref 0–0.2)
IMM GRANULOCYTES NFR BLD AUTO: 0 % (ref 0–2)
LYMPHOCYTES # BLD AUTO: 1.97 THOUSANDS/ÂΜL (ref 0.6–4.47)
LYMPHOCYTES NFR BLD AUTO: 22 % (ref 14–44)
MCH RBC QN AUTO: 29.5 PG (ref 26.8–34.3)
MCHC RBC AUTO-ENTMCNC: 33.4 G/DL (ref 31.4–37.4)
MCV RBC AUTO: 88 FL (ref 82–98)
MONOCYTES # BLD AUTO: 0.67 THOUSAND/ÂΜL (ref 0.17–1.22)
MONOCYTES NFR BLD AUTO: 7 % (ref 4–12)
NEUTROPHILS # BLD AUTO: 6.11 THOUSANDS/ÂΜL (ref 1.85–7.62)
NEUTS SEG NFR BLD AUTO: 66 % (ref 43–75)
NRBC BLD AUTO-RTO: 0 /100 WBCS
PLATELET # BLD AUTO: 373 THOUSANDS/UL (ref 149–390)
PMV BLD AUTO: 10.8 FL (ref 8.9–12.7)
RBC # BLD AUTO: 4.91 MILLION/UL (ref 3.81–5.12)
WBC # BLD AUTO: 9.18 THOUSAND/UL (ref 4.31–10.16)

## 2025-04-28 PROCEDURE — 85025 COMPLETE CBC W/AUTO DIFF WBC: CPT

## 2025-04-28 PROCEDURE — 84443 ASSAY THYROID STIM HORMONE: CPT

## 2025-04-28 PROCEDURE — 80061 LIPID PANEL: CPT

## 2025-04-28 PROCEDURE — 80053 COMPREHEN METABOLIC PANEL: CPT

## 2025-04-28 PROCEDURE — 36415 COLL VENOUS BLD VENIPUNCTURE: CPT

## 2025-04-28 PROCEDURE — 99395 PREV VISIT EST AGE 18-39: CPT | Performed by: NURSE PRACTITIONER

## 2025-04-28 NOTE — PROGRESS NOTES
Adult Annual Physical  Name: Pepper Hopson      : 1985      MRN: [id number]  Encounter Provider: Enedina Gamboa DNP  Encounter Date: 2025   Encounter department: [facility]    :  Assessment & Plan  Encounter for well adult exam without abnormal findings    Orders:    CBC and differential; Future    Comprehensive metabolic panel; Future    Lipid Panel with Direct LDL reflex; Future    TSH, 3rd generation with Free T4 reflex; Future        Preventive Screenings:    - Cervical cancer screening: screening up-to-date     Immunizations:  - Immunizations due: Influenza, Prevnar 20 and Tdap         History of Present Illness     Adult Annual Physical:  Patient presents for annual physical.     Diet and Physical Activity:  - Diet/Nutrition: poor diet.  - Exercise: no formal exercise.    Depression Screening:  - PHQ-2 Score: 0    General Health:  - Sleep: sleeps well.  - Hearing: normal hearing right ear and normal hearing left ear.  - Vision: no vision problems.  - Dental: regular dental visits.    /GYN Health:  - Follows with GYN: yes.   - Menopause: premenopausal.   - History of STDs: no    Review of Systems   Constitutional:  Negative for activity change, chills, fatigue and fever.   HENT:  Negative for congestion, ear discharge, ear pain, sinus pressure, sinus pain, sore throat, tinnitus and trouble swallowing.    Eyes:  Negative for photophobia, pain, discharge, itching and visual disturbance.   Respiratory:  Negative for cough, chest tightness, shortness of breath and wheezing.    Cardiovascular:  Negative for chest pain and leg swelling.   Gastrointestinal:  Negative for abdominal distention, abdominal pain, constipation, diarrhea, nausea and vomiting.   Endocrine: Negative for polydipsia, polyphagia and polyuria.   Genitourinary:  Negative for dysuria and frequency.   Musculoskeletal:  Negative for arthralgias, myalgias, neck pain and neck stiffness.   Skin:  Negative  "for color change.   Neurological:  Negative for dizziness, syncope, weakness, numbness and headaches.   Hematological:  Does not bruise/bleed easily.   Psychiatric/Behavioral:  Negative for behavioral problems, confusion, self-injury, sleep disturbance and suicidal ideas. The patient is not nervous/anxious.          Objective   /80 (BP Location: Left arm, Patient Position: Sitting, Cuff Size: Large)   Pulse 62   Temp (!) 97.3 °F (36.3 °C) (Tympanic)   Resp 18   Ht 5' 5\" (1.651 m)   Wt 102 kg (225 lb)   SpO2 99%   BMI 37.44 kg/m²     Physical Exam  Vitals and nursing note reviewed.   Constitutional:       Appearance: Normal appearance. She is well-developed.   HENT:      Head: Normocephalic and atraumatic.      Right Ear: Tympanic membrane, ear canal and external ear normal.      Left Ear: Tympanic membrane, ear canal and external ear normal.      Nose: Nose normal.   Eyes:      Extraocular Movements: Extraocular movements intact.      Conjunctiva/sclera: Conjunctivae normal.      Pupils: Pupils are equal, round, and reactive to light.   Cardiovascular:      Rate and Rhythm: Normal rate and regular rhythm.      Pulses: Normal pulses.      Heart sounds: Normal heart sounds.   Pulmonary:      Effort: Pulmonary effort is normal.      Breath sounds: Normal breath sounds. No wheezing or rhonchi.   Abdominal:      General: There is no distension.      Tenderness: There is no abdominal tenderness.   Musculoskeletal:         General: No swelling, tenderness, deformity or signs of injury.      Cervical back: Normal range of motion and neck supple.      Right lower leg: No edema.      Left lower leg: No edema.   Skin:     General: Skin is warm.      Findings: No bruising, erythema, lesion or rash.   Neurological:      General: No focal deficit present.      Mental Status: She is alert and oriented to person, place, and time.   Psychiatric:         Mood and Affect: Mood normal.         Behavior: Behavior normal.    "      Thought Content: Thought content normal.         Judgment: Judgment normal.

## 2025-04-29 ENCOUNTER — RESULTS FOLLOW-UP (OUTPATIENT)
Dept: FAMILY MEDICINE CLINIC | Facility: CLINIC | Age: 40
End: 2025-04-29

## 2025-04-29 LAB
ALBUMIN SERPL BCG-MCNC: 4.1 G/DL (ref 3.5–5)
ALP SERPL-CCNC: 75 U/L (ref 34–104)
ALT SERPL W P-5'-P-CCNC: 15 U/L (ref 7–52)
ANION GAP SERPL CALCULATED.3IONS-SCNC: 9 MMOL/L (ref 4–13)
AST SERPL W P-5'-P-CCNC: 15 U/L (ref 13–39)
BILIRUB SERPL-MCNC: 0.36 MG/DL (ref 0.2–1)
BUN SERPL-MCNC: 10 MG/DL (ref 5–25)
CALCIUM SERPL-MCNC: 9.3 MG/DL (ref 8.4–10.2)
CHLORIDE SERPL-SCNC: 104 MMOL/L (ref 96–108)
CHOLEST SERPL-MCNC: 150 MG/DL (ref ?–200)
CO2 SERPL-SCNC: 25 MMOL/L (ref 21–32)
CREAT SERPL-MCNC: 0.44 MG/DL (ref 0.6–1.3)
GFR SERPL CREATININE-BSD FRML MDRD: 129 ML/MIN/1.73SQ M
GLUCOSE P FAST SERPL-MCNC: 87 MG/DL (ref 65–99)
HDLC SERPL-MCNC: 44 MG/DL
LDLC SERPL CALC-MCNC: 87 MG/DL (ref 0–100)
POTASSIUM SERPL-SCNC: 4.2 MMOL/L (ref 3.5–5.3)
PROT SERPL-MCNC: 6.8 G/DL (ref 6.4–8.4)
SODIUM SERPL-SCNC: 138 MMOL/L (ref 135–147)
TRIGL SERPL-MCNC: 96 MG/DL (ref ?–150)
TSH SERPL DL<=0.05 MIU/L-ACNC: 1.28 UIU/ML (ref 0.45–4.5)

## 2025-05-02 ENCOUNTER — TELEMEDICINE (OUTPATIENT)
Age: 40
End: 2025-05-02
Payer: COMMERCIAL

## 2025-05-02 ENCOUNTER — TELEPHONE (OUTPATIENT)
Age: 40
End: 2025-05-02

## 2025-05-02 VITALS — BODY MASS INDEX: 37.49 KG/M2 | WEIGHT: 225 LBS | HEIGHT: 65 IN

## 2025-05-02 DIAGNOSIS — R42 VERTIGO: ICD-10-CM

## 2025-05-02 DIAGNOSIS — R39.9 URINARY SYMPTOM OR SIGN: ICD-10-CM

## 2025-05-02 DIAGNOSIS — G35 MULTIPLE SCLEROSIS (HCC): Primary | ICD-10-CM

## 2025-05-02 PROCEDURE — 99215 OFFICE O/P EST HI 40 MIN: CPT | Performed by: PSYCHIATRY & NEUROLOGY

## 2025-05-02 RX ORDER — TIRZEPATIDE 2.5 MG/.5ML
INJECTION, SOLUTION SUBCUTANEOUS
COMMUNITY
Start: 2025-04-28

## 2025-05-02 NOTE — PROGRESS NOTES
Neurology Virtual Visit Regular- Follow up Visit Note    Name: Pepper Hopson       : 1985       MRN: [id number]   Encounter Provider: Zeke Gonzalez MD   Encounter Date: 2025  Encounter department: [facility]    History of Present Illness         INTERIM HISTORY:  Pepper Hopson is a 39 y.o. female presenting with Multiple Sclerosis and Virtual Regular Visit    MS HISTORY:  Symptoms onset:   Initial symptoms: sensory symptoms including left leg tingling, paresthesia, left half of her body, speech complaints. Shakiness all over her body. 4 days of blurred vision when she in college  MS diagnosis: 11 years back--Dr. Ingram ([facility])  Disease course at onset: Relapsing remitting Multiple Sclerosis   Current disease course: Relapsing remitting Multiple Sclerosis   Subsequent symptoms: A few relapse over the years.   Family history of MS: No  Spinal tap: She had a spinal tap. She did not had the OCB.   Prior DMT's for MS: Rebif (side effects from the injections), Tecfidera as she developed breakthrough disease. Ocrevus~considering getting pregnant.      Safety labs: WNL  JCV test: Not indicated  NMO Ab:  never done   MOG Ab:never done   Last MRI of the brain:    3/9/2025-stable.  2024-stable.   Last MRI of the C spine: none done recently in the system   Last MRI of the T spine: none done recently in the system.        INTERIM HISTORY:    Current DMT: Kesimpta  Side effects: denies having any new side effects   Baseline safety labs: stable    New symptoms: no concerns at this time. She is going to start Zepbound today.   Progression: no concerning symptoms  New MRI findings: most recent MRI brain was stable.     Since last visit, Ms. Hopson reports that their symptoms are stable.        MS ROS:   Sensory symptoms (numbness, tingling and paresthesia):  Occasional numbness in the fingertips which usually go away within a few minutes otherwise no new complaints     Weakness: Denies having any new weakness   Spasms: No new complaints  Vision problems (loss of vision or double vision): May need new prescription glasses  Ambulatory dysfunction: No new complaints  Vertigo and Dizziness: Occasional vertigo when the car stops moving and when she suddenly stands up but nothing persistent and no new complaints  Cognitive complaints: No new complaints  Speech complaints: No new complaints  Anxiety: No new symptoms reported   Fatigue: Occasional fatigue which might be related with working   Bladder symptoms: Very mild overactive bladder but nothing concerning at this time   Bowel symptoms: No new symptoms      The patient is currently on Kesimpta for her multiple sclerosis and has experienced no side effects from the medication. Her last MRI was stable, and she has not had any new symptoms such as numbness, weakness, or muscle spasms. She experiences occasional tingling in her fingertips or toes, which resolves within a few hours and never lasts more than 24 hours.    She experiences a small amount of vertigo, particularly when putting the car into park or sometimes when standing up. This sensation is brief and does not cause her to topple. She does not report any significant vision changes, although she notes a decrease in her ability to see tiny print from far away.    No speech problems, anxiety, or depression. She has started a new job and is enrolled in an [employer] program, which has affected her sleep, leading to fatigue that she attributes to lifestyle rather than multiple sclerosis.    Regarding bladder function, she reports minor issues, such as a drop or two of urine leakage, but it is not significant enough to warrant medication. She has no bowel complaints.    She is starting a new medication, Zepbound, to aid in weight loss, which she plans to take once a week. She continues to take Kesimpta once a month.      PRIOR NOTES:  10/2/2024  MS ROS:   Sensory symptoms (numbness,  "tingling and paresthesia): No complaints   Weakness: No complaints   Spasms: No complaints   Vision problems (loss of vision or double vision): No complaints   Ambulatory dysfunction: No complaints   Vertigo and Dizziness: once in while she would have some vertigo when she is in car, lasts few seconds and then goes away   Cognitive complaints:  She feels that her memory is getting affected as well. She feels that she is not as sharp as she was before. (Works in [employer])   Speech complaints: No complaints      Fatigue: She does have fatigue. She feels that she is more tired. She also  think that it is situational. She would like to take naps.  She does not want to take a medicine.   Bladder symptoms:She does have some stress incontinence.   Bowel symptoms: No complaints      Lhermitte's sign: negative   Uhthoff's phenomenon:  positive  MS hugs like symptoms:  negative      Objective   Ht 5' 5\" (1.651 m)   Wt 102 kg (225 lb)   BMI 37.44 kg/m²        Neurological examination: Unable to assess as this is a telemedicine visit      Pertinent diagnostic testing:  Labs:  Lab Results   Component Value Date    WBC 9.18 04/28/2025     Lab Results   Component Value Date    HGB 14.5 04/28/2025     Lab Results   Component Value Date     04/28/2025       No results found for: \"FOLATE\"  No results found for: \"PIOZZBXV99\"  No results found for: \"COPPER\"  No results found for: \"METHYLMALON\"     Lab Results   Component Value Date    HEPBSAG Non-reactive 06/18/2024    HEPCAB Non-reactive 06/18/2024            Neuroimaging:             Results for orders placed during the hospital encounter of 03/09/25    MRI brain MS wo and w contrast    Impression  Unchanged multiple white matter lesions in a distribution compatible with multiple sclerosis. No new or enhancing lesions.    Workstation performed: [id number]                           Assessment & Plan  Multiple sclerosis (HCC)  Ms. Hopson is a very pleasant 39 y.o. " female presenting with MS follow up. Briefly the patient was diagnosed in 2012 based on her neurological symptoms and MRI brain imaging.  She also underwent a CSF analysis which came back negative but initial MRI of the brain was concerning for tumefactive lesion.  She was initially started on Rebif but due to injection site reactions was switched over to Tecfidera which she had breakthrough disease.  She was switched over to Ocrevus in 2022 but she got pregnant and unfortunately required D&C due to fetal chromosomal abnormalities at 18 weeks of pregnancy.  Most recently she has been started on Kesimpta and has been tolerating the medication well without any significant side effects nor she mentions any new neurological symptoms that would be concerning for any clinical relapse. MRI results show no new lesions. Occasional tingling in fingertips or toes resolves within a few hours. No new numbness, weakness, muscle spasms, or significant vision changes. Fatigue is attributed to lifestyle factors. No interaction concerns with new medication Zepbound for weight management       MULTIPLE SCLEROSIS PLAN:  Non active, non progressive phase of the disease.     Disease Modifying Therapy:   -Continue with Kesimpta as your disease modifying therapy for MS.     Safety labs:   - Stable    Neuroimaging:   - Would get MRI brain next year. No need for spinal imaging as no clinical symptoms.       Vertigo  Intermittent vertigo, possibly related to inner ear issues or orthostatic hypotension, occurs when car stops moving and occasionally upon standing. Symptoms are brief and not concerning. Increasing fluid intake may help manage potential orthostatic hypotension.  - Increase fluid intake to manage potential orthostatic hypotension.  - Consider physical therapy for vertigo if symptoms become bothersome.       Urinary symptom or sign  Mild bladder dysfunction with occasional minor leakage, not severe enough to warrant medication.  Prefers lifestyle modifications over medical intervention.  - Consider Kegel exercises to strengthen bladder muscles, possibly using online resources.             Ms. Hopson will Return in about 10 months (around 3/2/2026), or Telemedicine.    Administrative Statements     Encounter Provider:   Zeke Stephens MD.   Staff Neurologist.          The management plan was discussed in detail with the patient and all questions were answered.     Ms. Hopson was encouraged to contact our office with any questions or concerns and to contact the clinic or go to the nearest emergency room if symptoms change or worsen.     The Patient is located at Home and in the following state in which I hold an active license [place].    The patient was identified by name and date of birth. Pepper Hopson was informed that this is a telemedicine visit and that the visit is being conducted through the Epic Embedded platform. She agrees to proceed..  My office door was closed. No one else was in the room.. she acknowledged consent and understanding of privacy and security of the video platform. The patient has agreed to participate and understands they can discontinue the visit at any time.    I have spent a total time of 41 minutes in caring for this patient on the day of the visit/encounter including Diagnostic results, Prognosis, Risks and benefits of tx options, Instructions for management, Patient and family education, Importance of tx compliance, Risk factor reductions, Impressions, Counseling / Coordination of care, Documenting in the medical record, Reviewing/placing orders in the medical record (including tests, medications, and/or procedures), and Obtaining or reviewing history  .             This report has been created through the use of voice recognition/text compilation software.  Typographical and content errors may occur with this process.  While efforts are made to detect and correct such errors, in some cases errors  will persist.  For this reason, wording in this document should be considered in the proper context and not strictly verbatim.  If, when reviewing the document, an error is discovered, please let the office know at [phone]

## 2025-05-02 NOTE — ASSESSMENT & PLAN NOTE
Ms. Hopson is a very pleasant 39 y.o. female presenting with MS follow up. Briefly the patient was diagnosed in 2012 based on her neurological symptoms and MRI brain imaging.  She also underwent a CSF analysis which came back negative but initial MRI of the brain was concerning for tumefactive lesion.  She was initially started on Rebif but due to injection site reactions was switched over to Tecfidera which she had breakthrough disease.  She was switched over to Ocrevus in 2022 but she got pregnant and unfortunately required D&C due to fetal chromosomal abnormalities at 18 weeks of pregnancy.  Most recently she has been started on Kesimpta and has been tolerating the medication well without any significant side effects nor she mentions any new neurological symptoms that would be concerning for any clinical relapse. MRI results show no new lesions. Occasional tingling in fingertips or toes resolves within a few hours. No new numbness, weakness, muscle spasms, or significant vision changes. Fatigue is attributed to lifestyle factors. No interaction concerns with new medication Zepbound for weight management       MULTIPLE SCLEROSIS PLAN:  Non active, non progressive phase of the disease.     Disease Modifying Therapy:   -Continue with Kesimpta as your disease modifying therapy for MS.     Safety labs:   - Stable    Neuroimaging:   - Would get MRI brain next year. No need for spinal imaging as no clinical symptoms.

## 2025-05-02 NOTE — TELEPHONE ENCOUNTER
Called patient and left voicemail message requested they start logging into [employer] 15 minutes before appointment time. In additon, that they complete the E-Check in process and make any copayments that may apply to todays visit.

## 2025-05-29 ENCOUNTER — OFFICE VISIT (OUTPATIENT)
Dept: BARIATRICS | Facility: CLINIC | Age: 40
End: 2025-05-29
Payer: COMMERCIAL

## 2025-05-29 ENCOUNTER — TELEPHONE (OUTPATIENT)
Dept: NEUROLOGY | Facility: CLINIC | Age: 40
End: 2025-05-29

## 2025-05-29 ENCOUNTER — OFFICE VISIT (OUTPATIENT)
Dept: URGENT CARE | Facility: CLINIC | Age: 40
End: 2025-05-29
Payer: COMMERCIAL

## 2025-05-29 VITALS
WEIGHT: 211 LBS | DIASTOLIC BLOOD PRESSURE: 82 MMHG | BODY MASS INDEX: 35.16 KG/M2 | HEART RATE: 76 BPM | OXYGEN SATURATION: 98 % | HEIGHT: 65 IN | SYSTOLIC BLOOD PRESSURE: 110 MMHG

## 2025-05-29 VITALS
HEIGHT: 65 IN | OXYGEN SATURATION: 98 % | HEART RATE: 76 BPM | SYSTOLIC BLOOD PRESSURE: 110 MMHG | DIASTOLIC BLOOD PRESSURE: 82 MMHG | WEIGHT: 211 LBS | BODY MASS INDEX: 35.16 KG/M2

## 2025-05-29 DIAGNOSIS — N30.01 ACUTE CYSTITIS WITH HEMATURIA: Primary | ICD-10-CM

## 2025-05-29 DIAGNOSIS — E66.09 CLASS 2 OBESITY DUE TO EXCESS CALORIES WITHOUT SERIOUS COMORBIDITY WITH BODY MASS INDEX (BMI) OF 37.0 TO 37.9 IN ADULT: Primary | ICD-10-CM

## 2025-05-29 DIAGNOSIS — E66.812 CLASS 2 OBESITY DUE TO EXCESS CALORIES WITHOUT SERIOUS COMORBIDITY WITH BODY MASS INDEX (BMI) OF 37.0 TO 37.9 IN ADULT: Primary | ICD-10-CM

## 2025-05-29 LAB
SL AMB  POCT GLUCOSE, UA: NEGATIVE
SL AMB LEUKOCYTE ESTERASE,UA: ABNORMAL
SL AMB POCT BILIRUBIN,UA: NEGATIVE
SL AMB POCT BLOOD,UA: ABNORMAL
SL AMB POCT CLARITY,UA: CLEAR
SL AMB POCT COLOR,UA: YELLOW
SL AMB POCT KETONES,UA: 80
SL AMB POCT NITRITE,UA: NEGATIVE
SL AMB POCT PH,UA: 5
SL AMB POCT SPECIFIC GRAVITY,UA: 1.02
SL AMB POCT URINE PROTEIN: 30
SL AMB POCT UROBILINOGEN: 0.2

## 2025-05-29 PROCEDURE — 87086 URINE CULTURE/COLONY COUNT: CPT | Performed by: FAMILY MEDICINE

## 2025-05-29 PROCEDURE — 99214 OFFICE O/P EST MOD 30 MIN: CPT

## 2025-05-29 PROCEDURE — 81002 URINALYSIS NONAUTO W/O SCOPE: CPT | Performed by: FAMILY MEDICINE

## 2025-05-29 PROCEDURE — 99213 OFFICE O/P EST LOW 20 MIN: CPT | Performed by: FAMILY MEDICINE

## 2025-05-29 RX ORDER — NITROFURANTOIN 25; 75 MG/1; MG/1
100 CAPSULE ORAL 2 TIMES DAILY
Qty: 10 CAPSULE | Refills: 0 | Status: SHIPPED | OUTPATIENT
Start: 2025-05-29 | End: 2025-06-03

## 2025-05-29 RX ORDER — TIRZEPATIDE 2.5 MG/.5ML
2.5 INJECTION, SOLUTION SUBCUTANEOUS WEEKLY
Qty: 2 ML | Refills: 0 | Status: SHIPPED | OUTPATIENT
Start: 2025-05-29

## 2025-05-29 NOTE — PROGRESS NOTES
[facility]  Name: Pepper Hopson      : 1985      MRN: [id number]  Encounter Provider: Radha Blanco DO  Encounter Date: 2025   Encounter department: [facility] [place]  :  Assessment & Plan  Acute cystitis with hematuria    Orders:  •  nitrofurantoin (MACROBID) 100 mg capsule; Take 1 capsule (100 mg total) by mouth 2 (two) times a day for 5 days  UTI based on UA  Treated with antibiotics as above.  Will await C/S and change medication if needed.  Supportive care measures discussed  ED precautions discussed.        Patient Instructions  Follow up with PCP in 3-5 days.  Proceed to  ER if symptoms worsen.    If tests are performed, our office will contact you with results only if changes need to made to the care plan discussed with you at the visit. You can review your full results on [facility] MyChart.    Chief Complaint:   Chief Complaint   Patient presents with   • Possible UTI     Standard, frequent urge to urinate, pressure in the pelvic region, and not much coming out, slight burn as well. Started about Tuesday.      History of Present Illness   Urinary Tract Infection   This is a new problem. The current episode started in the past 7 days. The problem occurs every urination. The problem has been unchanged. The quality of the pain is described as burning and aching. The pain is moderate. There has been no fever. Associated symptoms include frequency, hesitancy and urgency. Pertinent negatives include no chills, discharge, flank pain, hematuria, nausea, possible pregnancy, sweats or vomiting. She has tried increased fluids for the symptoms.         Review of Systems   Constitutional:  Negative for chills.   Gastrointestinal:  Negative for nausea and vomiting.   Genitourinary:  Positive for frequency, hesitancy and urgency. Negative for flank pain and hematuria.     Past Medical History   Past Medical History[1]  Past Surgical History[2]  Family History[3]  she  "reports that she has been smoking cigarettes. She started smoking about 2 years ago. She has a 1.2 pack-year smoking history. She has been exposed to tobacco smoke. She has never used smokeless tobacco. She reports current alcohol use. She reports that she does not use drugs.  Current Outpatient Medications   Medication Instructions   • Kesimpta 20 mg, Subcutaneous, Every 28 days, Starting at week 4   • nitrofurantoin (MACROBID) 100 mg, Oral, 2 times daily   • Prenatal MV-Min-Fe Fum-FA-DHA (PRENATAL 1 PO) Take by mouth   • Zepbound 2.5 mg, Subcutaneous, Weekly   Allergies[4]     Objective   /82 (Patient Position: Sitting, Cuff Size: Standard)   Pulse 76   Ht 5' 5\" (1.651 m)   Wt 95.7 kg (211 lb)   LMP 05/08/2025 (Approximate)   SpO2 98%   BMI 35.11 kg/m²      Physical Exam  Vitals and nursing note reviewed.   Constitutional:       General: She is not in acute distress.     Appearance: Normal appearance. She is not ill-appearing or toxic-appearing.   HENT:      Head: Normocephalic and atraumatic.     Cardiovascular:      Rate and Rhythm: Normal rate and regular rhythm.      Heart sounds: No murmur heard.  Pulmonary:      Effort: Pulmonary effort is normal. No respiratory distress.      Breath sounds: Normal breath sounds.   Abdominal:      General: Abdomen is flat. There is no distension.      Palpations: Abdomen is soft.      Tenderness: There is abdominal tenderness. There is no right CVA tenderness, left CVA tenderness, guarding or rebound.      Comments: Minimal suprapubic tenderness.     Skin:     General: Skin is warm and dry.      Capillary Refill: Capillary refill takes less than 2 seconds.     Neurological:      General: No focal deficit present.      Mental Status: She is alert and oriented to person, place, and time.     Psychiatric:         Mood and Affect: Mood normal.         Behavior: Behavior normal.         Thought Content: Thought content normal.         Portions of the record may have " "been created with voice recognition software.  Occasional wrong word or \"sound a like\" substitutions may have occurred due to the inherent limitations of voice recognition software.  Read the chart carefully and recognize, using context, where substitutions have occurred.         [1]  Past Medical History:  Diagnosis Date   • Multiple sclerosis affecting pregnancy in first trimester (HCC)    [2]  Past Surgical History:  Procedure Laterality Date   • MS INDUCED  DILATION & EVACUATION N/A 2022    Procedure: DILATATION AND EVACUATION (D&E)  18 weeks;  Surgeon: Torrie Solares MD;  Location: [facility];  Service: Gynecology   • TONSILLECTOMY     • WISDOM TOOTH EXTRACTION     [3]  Family History  Problem Relation Name Age of Onset   • No Known Problems Mother     • Diabetes Father     • No Known Problems Maternal Grandmother     • Prostate cancer Maternal Grandfather Te    • Dementia Maternal Grandfather Te    • No Known Problems Paternal Grandmother     • Prostate cancer Paternal Grandfather          Prostate Cancer   • Diabetes Paternal Grandfather     • Breast cancer Neg Hx     • Colon cancer Neg Hx     • Cervical cancer Neg Hx     • Endometrial cancer Neg Hx     • Uterine cancer Neg Hx     • Ovarian cancer Neg Hx     [4]  Allergies  Allergen Reactions   • Latex Dermatitis   • Pollen Extract Allergic Rhinitis   "

## 2025-05-29 NOTE — ASSESSMENT & PLAN NOTE
- Patient is pursuing Conservative Program and follow up visits with medical weight management provider  - Initial weight loss goal of 5-10% weight loss for improved health. Weight loss can improve patient's co-morbid conditions and/or prevent weight-related complications.  - Explained the importance of continuing lifestyle changes in addition to any anti-obesity medications.   - Labs reviewed from 4/2025    General Recommendations:  Nutrition:  Eat breakfast daily.  Do not skip meals.      Food log (ie.) www.[email or web address].com, sparkpeople.com, loseit.com, calorieking.com, etc.     Practice mindful eating.  Be sure to set aside time to eat, eat slowly, and savor your food.     Hydration:    At least 64oz of water daily.  No sugar sweetened beverages.  No juice (eat the fruit instead).     Exercise:  Studies have shown that the ideal exercise goal is somewhere between 150 to 300 minutes of moderate intensity exercise a week.  Start with exercising 10 minutes every other day and gradually increase physical activity with a goal of at least 150 minutes of moderate intensity exercise a week, divided over at least 3 days a week.  An example of this would be exercising 30 minutes a day, 5 days a week.  Resistance training can increase muscle mass and increase our resting metabolic rate.   FULL BODY resistance training is recommended 2-3 times a week.  Do not do this on consecutive days to allow for muscle recovery.     Aim for a bare minimum 5000 steps, even on days you do not exercise.     Monitoring:   Weigh yourself daily.  If this causes undue stress, then just weigh yourself once a week.  Weigh yourself the same time of the day with the same amount of clothing on.  Preferably this should be done after waking up, before you eat, and with no clothing or minimal clothing on.     Specific Goals:  -Discussed her current nutrition and she is encouraged to log her food. Continue to keep up her protein and fiber intake.  Discussed increasing her physical activity to maximize her weight loss. Discussed fodr chews or ford tea for her nausea and changing her injection day to a weekend.   Discussed remaining on 2.5mg of zepbound for one more month to see if the nausea reduces.   Patient denies personal history of pancreatitis. Patient also denies personal and family history of medullary thyroid cancer and multiple endocrine neoplasia type 2 (MEN 2 tumor).

## 2025-05-29 NOTE — PROGRESS NOTES
Assessment/Plan:     Class 2 obesity due to excess calories without serious comorbidity with body mass index (BMI) of 37.0 to 37.9 in adult  - Patient is pursuing Conservative Program and follow up visits with medical weight management provider  - Initial weight loss goal of 5-10% weight loss for improved health. Weight loss can improve patient's co-morbid conditions and/or prevent weight-related complications.  - Explained the importance of continuing lifestyle changes in addition to any anti-obesity medications.   - Labs reviewed from 4/2025    General Recommendations:  Nutrition:  Eat breakfast daily.  Do not skip meals.      Food log (ie.) [email or web address], [email or web address], loseit.com, [email or web address], etc.     Practice mindful eating.  Be sure to set aside time to eat, eat slowly, and savor your food.     Hydration:    At least 64oz of water daily.  No sugar sweetened beverages.  No juice (eat the fruit instead).     Exercise:  Studies have shown that the ideal exercise goal is somewhere between 150 to 300 minutes of moderate intensity exercise a week.  Start with exercising 10 minutes every other day and gradually increase physical activity with a goal of at least 150 minutes of moderate intensity exercise a week, divided over at least 3 days a week.  An example of this would be exercising 30 minutes a day, 5 days a week.  Resistance training can increase muscle mass and increase our resting metabolic rate.   FULL BODY resistance training is recommended 2-3 times a week.  Do not do this on consecutive days to allow for muscle recovery.     Aim for a bare minimum 5000 steps, even on days you do not exercise.     Monitoring:   Weigh yourself daily.  If this causes undue stress, then just weigh yourself once a week.  Weigh yourself the same time of the day with the same amount of clothing on.  Preferably this should be done after waking up, before you eat, and with no clothing or minimal clothing on.      Specific Goals:  -Discussed her current nutrition and she is encouraged to log her food. Continue to keep up her protein and fiber intake. Discussed increasing her physical activity to maximize her weight loss. Discussed ford chews or ford tea for her nausea and changing her injection day to a weekend.   Discussed remaining on 2.5mg of zepbound for one more month to see if the nausea reduces.   Patient denies personal history of pancreatitis. Patient also denies personal and family history of medullary thyroid cancer and multiple endocrine neoplasia type 2 (MEN 2 tumor).          Pepper was seen today for follow-up.    Diagnoses and all orders for this visit:    Class 2 obesity due to excess calories without serious comorbidity with body mass index (BMI) of 37.0 to 37.9 in adult  -     Zepbound 2.5 MG/0.5ML auto-injector; Inject 0.5 mL (2.5 mg total) under the skin once a week        Total time spent reviewing chart, interviewing patient, examining patient, discussing plan, answering all questions, and documentin minutes with >50% face-to-face time with the patient.    Follow up in approximately 3 months with Non-Surgical Physician/Advanced Practitioner.    Subjective:   Chief Complaint   Patient presents with    Follow-up     Zepbound follow up 2.5mg       Patient ID: Pepper Hopson  is a 39 y.o. female with excess weight/obesity here to pursue weight management.  Patient is pursuing Conservative Program.   Most recent notes and records were reviewed.    HPI    Wt Readings from Last 20 Encounters:   25 95.7 kg (211 lb)   25 102 kg (225 lb)   25 102 kg (225 lb)   25 103 kg (228 lb)   10/02/24 99.8 kg (220 lb)   24 98.9 kg (218 lb)   24 96 kg (211 lb 9.6 oz)   24 95.7 kg (211 lb)   24 94.9 kg (209 lb 3.2 oz)   24 90.7 kg (200 lb)   23 93.4 kg (205 lb 12.8 oz)   23 92.6 kg (204 lb 3.2 oz)   23 93 kg (205 lb)   23 92.1 kg (203 lb)    12/29/22 92.1 kg (203 lb)   11/22/22 92 kg (202 lb 13.2 oz)   11/21/22 92.1 kg (203 lb)   11/07/22 91.4 kg (201 lb 6.4 oz)   10/21/22 90.5 kg (199 lb 9.6 oz)   10/04/22 87.5 kg (193 lb)       Patient presents today to medical weight management office for follow up. She was able to start the zepbound and is doing well however she has a pretty bad day the day she takes the shot, she has nausea and vomiting and does not want to eat anything. But after that day everything is better and she is fine.     Weight loss medication and dose: None   Started weight and date: 228 lbs 2/19/2025  Current weight: 211 lbs   Difference: -17 lbs     Starting BMI: 37  Current BMI: 35    Diet recall:  B: Skips sometimes or yogurt or toast or half a bagel   S: a bite of a carb item   L: Left overs from dinner or sandwich   S: a bite of something usually a carb   D: Protein starch or veggie   S: chips or sweets   Take out frequency: 3xs a week      Hydration: Water, sugar free sodas with dinner, sugar fruit punch or something   Alcohol: rarely   Smoking: yes   Exercise: walking dogs / house cleaning       The following portions of the patient's history were reviewed and updated as appropriate: allergies, current medications, past family history, past medical history, past social history, past surgical history, and problem list.    Family History[1]     Review of Systems   Constitutional:  Negative for fatigue.   HENT:  Negative for sore throat.    Respiratory:  Negative for cough and shortness of breath.    Cardiovascular:  Negative for chest pain, palpitations and leg swelling.   Gastrointestinal:  Negative for abdominal pain, constipation, diarrhea, nausea and vomiting.   Genitourinary:  Negative for dysuria.   Musculoskeletal:  Negative for arthralgias and back pain.   Skin:  Negative for rash.   Neurological:  Negative for headaches.   Psychiatric/Behavioral:  Negative for dysphoric mood. The patient is not nervous/anxious.   "      Objective:  /82 (BP Location: Left arm, Patient Position: Sitting, Cuff Size: Large)   Pulse 76   Ht 5' 5\" (1.651 m)   Wt 95.7 kg (211 lb)   LMP 05/08/2025 (Approximate)   SpO2 98%   BMI 35.11 kg/m²     Physical Exam  Vitals and nursing note reviewed.            Labs   Most recent labs reviewed   Lab Results   Component Value Date    SODIUM 138 04/28/2025    K 4.2 04/28/2025     04/28/2025    CO2 25 04/28/2025    AGAP 9 04/28/2025    BUN 10 04/28/2025    CREATININE 0.44 (L) 04/28/2025    GLUF 87 04/28/2025    CALCIUM 9.3 04/28/2025    AST 15 04/28/2025    ALT 15 04/28/2025    ALKPHOS 75 04/28/2025    TP 6.8 04/28/2025    TBILI 0.36 04/28/2025    EGFR 129 04/28/2025     Lab Results   Component Value Date    HGBA1C 5.0 01/24/2023     Lab Results   Component Value Date    JHT8ZXHPLMZQ 1.277 04/28/2025     Lab Results   Component Value Date    CHOLESTEROL 150 04/28/2025     Lab Results   Component Value Date    HDL 44 (L) 04/28/2025     Lab Results   Component Value Date    TRIG 96 04/28/2025     Lab Results   Component Value Date    LDLCALC 87 04/28/2025          [1]   Family History  Problem Relation Name Age of Onset    No Known Problems Mother      Diabetes Father      No Known Problems Maternal Grandmother      Prostate cancer Maternal Grandfather Te     Dementia Maternal Grandfather Te     No Known Problems Paternal Grandmother      Prostate cancer Paternal Grandfather          Prostate Cancer    Diabetes Paternal Grandfather      Breast cancer Neg Hx      Colon cancer Neg Hx      Cervical cancer Neg Hx      Endometrial cancer Neg Hx      Uterine cancer Neg Hx      Ovarian cancer Neg Hx       "

## 2025-05-30 LAB — BACTERIA UR CULT: NORMAL

## 2025-05-30 NOTE — TELEPHONE ENCOUNTER
Karuna from [facility] called on the triage line to inform that pt's [facility] will need a PA renewal as the old PA will  on 25.    Please also see the PA request for [employer] below.    Once PA is approved please call [facility] at # [phone] anytime between 8:30 am and 8 pm EST.    You can also fax the PA approval to # [phone]. Thank you.

## 2025-06-02 DIAGNOSIS — G35 MULTIPLE SCLEROSIS (HCC): ICD-10-CM

## 2025-06-02 RX ORDER — OFATUMUMAB 20 MG/.4ML
INJECTION, SOLUTION SUBCUTANEOUS
Qty: 1.2 ML | Refills: 6 | Status: SHIPPED | OUTPATIENT
Start: 2025-06-02

## 2025-06-02 NOTE — TELEPHONE ENCOUNTER
Key: [id number]    PA submitted via [facility]. Received immediate outcome.     PA approved from 5/3/2025 - 6/2/2026.    Called [facility]. Spoke with Alex. Informed him of approval. Nothing further at this time.

## 2025-06-25 DIAGNOSIS — E66.812 CLASS 2 OBESITY DUE TO EXCESS CALORIES WITHOUT SERIOUS COMORBIDITY WITH BODY MASS INDEX (BMI) OF 37.0 TO 37.9 IN ADULT: ICD-10-CM

## 2025-06-25 DIAGNOSIS — E66.09 CLASS 2 OBESITY DUE TO EXCESS CALORIES WITHOUT SERIOUS COMORBIDITY WITH BODY MASS INDEX (BMI) OF 37.0 TO 37.9 IN ADULT: ICD-10-CM

## 2025-06-25 RX ORDER — TIRZEPATIDE 5 MG/.5ML
5 INJECTION, SOLUTION SUBCUTANEOUS WEEKLY
Qty: 2 ML | Refills: 0 | Status: SHIPPED | OUTPATIENT
Start: 2025-06-25 | End: 2025-07-23

## 2025-06-25 RX ORDER — TIRZEPATIDE 2.5 MG/.5ML
2.5 INJECTION, SOLUTION SUBCUTANEOUS WEEKLY
Qty: 2 ML | Refills: 0 | Status: CANCELLED | OUTPATIENT
Start: 2025-06-25

## 2025-07-29 ENCOUNTER — TELEPHONE (OUTPATIENT)
Dept: BARIATRICS | Facility: CLINIC | Age: 40
End: 2025-07-29

## 2025-07-29 DIAGNOSIS — E66.812 CLASS 2 OBESITY DUE TO EXCESS CALORIES WITHOUT SERIOUS COMORBIDITY WITH BODY MASS INDEX (BMI) OF 37.0 TO 37.9 IN ADULT: Primary | ICD-10-CM

## 2025-07-29 DIAGNOSIS — E66.09 CLASS 2 OBESITY DUE TO EXCESS CALORIES WITHOUT SERIOUS COMORBIDITY WITH BODY MASS INDEX (BMI) OF 37.0 TO 37.9 IN ADULT: Primary | ICD-10-CM

## 2025-07-29 RX ORDER — TIRZEPATIDE 5 MG/.5ML
5 INJECTION, SOLUTION SUBCUTANEOUS WEEKLY
Qty: 2 ML | Refills: 1 | Status: SHIPPED | OUTPATIENT
Start: 2025-07-29 | End: 2025-09-23